# Patient Record
Sex: MALE | Race: BLACK OR AFRICAN AMERICAN | Employment: OTHER | URBAN - METROPOLITAN AREA
[De-identification: names, ages, dates, MRNs, and addresses within clinical notes are randomized per-mention and may not be internally consistent; named-entity substitution may affect disease eponyms.]

---

## 2018-02-13 ENCOUNTER — HOSPITAL ENCOUNTER (OUTPATIENT)
Age: 58
Setting detail: SPECIMEN
Discharge: HOME | End: 2018-02-13
Payer: COMMERCIAL

## 2018-02-13 LAB
ABSOLUTE EOS #: 0.15 K/UL (ref 0–0.44)
ABSOLUTE IMMATURE GRANULOCYTE: <0.03 K/UL (ref 0–0.3)
ABSOLUTE LYMPH #: 0.75 K/UL (ref 1.1–3.7)
ABSOLUTE MONO #: 0.52 K/UL (ref 0.1–1.2)
ALBUMIN SERPL-MCNC: 4.3 G/DL (ref 3.5–5.2)
ALBUMIN/GLOBULIN RATIO: 1.2 (ref 1–2.5)
ALP BLD-CCNC: 164 U/L (ref 40–129)
ALT SERPL-CCNC: 63 U/L (ref 5–41)
ANION GAP SERPL CALCULATED.3IONS-SCNC: 14 MMOL/L (ref 9–17)
AST SERPL-CCNC: 137 U/L
BASOPHILS # BLD: 1 % (ref 0–2)
BASOPHILS ABSOLUTE: <0.03 K/UL (ref 0–0.2)
BILIRUB SERPL-MCNC: 0.3 MG/DL (ref 0.3–1.2)
BILIRUBIN URINE: NEGATIVE
BUN BLDV-MCNC: 5 MG/DL (ref 6–20)
BUN/CREAT BLD: ABNORMAL (ref 9–20)
CALCIUM SERPL-MCNC: 8.5 MG/DL (ref 8.6–10.4)
CHLORIDE BLD-SCNC: 102 MMOL/L (ref 98–107)
CHOLESTEROL/HDL RATIO: 1.8
CHOLESTEROL: 163 MG/DL
CO2: 25 MMOL/L (ref 20–31)
COLOR: YELLOW
COMMENT UA: ABNORMAL
CREAT SERPL-MCNC: 0.66 MG/DL (ref 0.7–1.2)
DIFFERENTIAL TYPE: ABNORMAL
EOSINOPHILS RELATIVE PERCENT: 5 % (ref 1–4)
ESTIMATED AVERAGE GLUCOSE: 117 MG/DL
GFR AFRICAN AMERICAN: >60 ML/MIN
GFR NON-AFRICAN AMERICAN: >60 ML/MIN
GFR SERPL CREATININE-BSD FRML MDRD: ABNORMAL ML/MIN/{1.73_M2}
GFR SERPL CREATININE-BSD FRML MDRD: ABNORMAL ML/MIN/{1.73_M2}
GLUCOSE BLD-MCNC: 73 MG/DL (ref 70–99)
GLUCOSE URINE: NEGATIVE
HBA1C MFR BLD: 5.7 % (ref 4–6)
HCT VFR BLD CALC: 37.3 % (ref 40.7–50.3)
HDLC SERPL-MCNC: 89 MG/DL
HEMOGLOBIN: 12.2 G/DL (ref 13–17)
IMMATURE GRANULOCYTES: 0 %
KETONES, URINE: NEGATIVE
LDL CHOLESTEROL: 63 MG/DL (ref 0–130)
LEUKOCYTE ESTERASE, URINE: NEGATIVE
LYMPHOCYTES # BLD: 26 % (ref 24–43)
MCH RBC QN AUTO: 30.3 PG (ref 25.2–33.5)
MCHC RBC AUTO-ENTMCNC: 32.7 G/DL (ref 28.4–34.8)
MCV RBC AUTO: 92.8 FL (ref 82.6–102.9)
MONOCYTES # BLD: 18 % (ref 3–12)
NITRITE, URINE: NEGATIVE
NRBC AUTOMATED: 0 PER 100 WBC
PDW BLD-RTO: 13.2 % (ref 11.8–14.4)
PH UA: 7 (ref 5–8)
PLATELET # BLD: 138 K/UL (ref 138–453)
PLATELET ESTIMATE: ABNORMAL
PMV BLD AUTO: 10.3 FL (ref 8.1–13.5)
POTASSIUM SERPL-SCNC: 4.5 MMOL/L (ref 3.7–5.3)
PROTEIN UA: NEGATIVE
RBC # BLD: 4.02 M/UL (ref 4.21–5.77)
RBC # BLD: ABNORMAL 10*6/UL
SEG NEUTROPHILS: 50 % (ref 36–65)
SEGMENTED NEUTROPHILS ABSOLUTE COUNT: 1.44 K/UL (ref 1.5–8.1)
SODIUM BLD-SCNC: 141 MMOL/L (ref 135–144)
SPECIFIC GRAVITY UA: 1 (ref 1–1.03)
THYROXINE, FREE: 0.84 NG/DL (ref 0.93–1.7)
TOTAL PROTEIN: 7.8 G/DL (ref 6.4–8.3)
TRIGL SERPL-MCNC: 54 MG/DL
TSH SERPL DL<=0.05 MIU/L-ACNC: 1.38 MIU/L (ref 0.3–5)
TURBIDITY: CLEAR
URINE HGB: NEGATIVE
UROBILINOGEN, URINE: NORMAL
VITAMIN D 25-HYDROXY: 18.8 NG/ML (ref 30–100)
VLDLC SERPL CALC-MCNC: NORMAL MG/DL (ref 1–30)
WBC # BLD: 2.9 K/UL (ref 3.5–11.3)
WBC # BLD: ABNORMAL 10*3/UL

## 2018-02-22 ENCOUNTER — HOSPITAL ENCOUNTER (OUTPATIENT)
Age: 58
Setting detail: SPECIMEN
Discharge: HOME | End: 2018-02-22
Payer: COMMERCIAL

## 2018-02-22 LAB
ALBUMIN SERPL-MCNC: 4.7 G/DL (ref 3.5–5.2)
ALBUMIN/GLOBULIN RATIO: 1.4 (ref 1–2.5)
ALP BLD-CCNC: 168 U/L (ref 40–129)
ALT SERPL-CCNC: 67 U/L (ref 5–41)
ANION GAP SERPL CALCULATED.3IONS-SCNC: 16 MMOL/L (ref 9–17)
AST SERPL-CCNC: 141 U/L
BILIRUB SERPL-MCNC: 0.57 MG/DL (ref 0.3–1.2)
BUN BLDV-MCNC: 7 MG/DL (ref 6–20)
BUN/CREAT BLD: ABNORMAL (ref 9–20)
CALCIUM SERPL-MCNC: 8.8 MG/DL (ref 8.6–10.4)
CHLORIDE BLD-SCNC: 91 MMOL/L (ref 98–107)
CO2: 26 MMOL/L (ref 20–31)
CREAT SERPL-MCNC: 0.74 MG/DL (ref 0.7–1.2)
GFR AFRICAN AMERICAN: >60 ML/MIN
GFR NON-AFRICAN AMERICAN: >60 ML/MIN
GFR SERPL CREATININE-BSD FRML MDRD: ABNORMAL ML/MIN/{1.73_M2}
GFR SERPL CREATININE-BSD FRML MDRD: ABNORMAL ML/MIN/{1.73_M2}
GGT: 418 U/L (ref 8–61)
GLUCOSE BLD-MCNC: 94 MG/DL (ref 70–99)
HAV IGM SER IA-ACNC: NONREACTIVE
HEPATITIS B CORE IGM ANTIBODY: NONREACTIVE
HEPATITIS B SURFACE ANTIGEN: NONREACTIVE
HEPATITIS C ANTIBODY: NONREACTIVE
HIV AG/AB: NONREACTIVE
POTASSIUM SERPL-SCNC: 4.3 MMOL/L (ref 3.7–5.3)
SODIUM BLD-SCNC: 133 MMOL/L (ref 135–144)
T. PALLIDUM, IGG: NONREACTIVE
TOTAL PROTEIN: 8 G/DL (ref 6.4–8.3)

## 2018-05-15 ENCOUNTER — HOSPITAL ENCOUNTER (OUTPATIENT)
Age: 58
Setting detail: SPECIMEN
Discharge: HOME | End: 2018-05-15
Payer: COMMERCIAL

## 2018-05-15 LAB
ABSOLUTE EOS #: 0.15 K/UL (ref 0–0.44)
ABSOLUTE IMMATURE GRANULOCYTE: <0.03 K/UL (ref 0–0.3)
ABSOLUTE LYMPH #: 0.87 K/UL (ref 1.1–3.7)
ABSOLUTE MONO #: 0.56 K/UL (ref 0.1–1.2)
ALBUMIN SERPL-MCNC: 4.4 G/DL (ref 3.5–5.2)
ALBUMIN/GLOBULIN RATIO: 1.3 (ref 1–2.5)
ALP BLD-CCNC: 144 U/L (ref 40–129)
ALT SERPL-CCNC: 117 U/L (ref 5–41)
ANION GAP SERPL CALCULATED.3IONS-SCNC: 14 MMOL/L (ref 9–17)
AST SERPL-CCNC: 156 U/L
BASOPHILS # BLD: 1 % (ref 0–2)
BASOPHILS ABSOLUTE: <0.03 K/UL (ref 0–0.2)
BILIRUB SERPL-MCNC: 0.36 MG/DL (ref 0.3–1.2)
BUN BLDV-MCNC: 7 MG/DL (ref 6–20)
BUN/CREAT BLD: ABNORMAL (ref 9–20)
CALCIUM SERPL-MCNC: 8.9 MG/DL (ref 8.6–10.4)
CHLORIDE BLD-SCNC: 99 MMOL/L (ref 98–107)
CHOLESTEROL/HDL RATIO: 1.8
CHOLESTEROL: 167 MG/DL
CO2: 26 MMOL/L (ref 20–31)
CREAT SERPL-MCNC: 0.57 MG/DL (ref 0.7–1.2)
DIFFERENTIAL TYPE: ABNORMAL
EOSINOPHILS RELATIVE PERCENT: 4 % (ref 1–4)
ESTIMATED AVERAGE GLUCOSE: 108 MG/DL
GFR AFRICAN AMERICAN: >60 ML/MIN
GFR NON-AFRICAN AMERICAN: >60 ML/MIN
GFR SERPL CREATININE-BSD FRML MDRD: ABNORMAL ML/MIN/{1.73_M2}
GFR SERPL CREATININE-BSD FRML MDRD: ABNORMAL ML/MIN/{1.73_M2}
GLUCOSE BLD-MCNC: 76 MG/DL (ref 70–99)
HBA1C MFR BLD: 5.4 % (ref 4–6)
HCT VFR BLD CALC: 37.6 % (ref 40.7–50.3)
HDLC SERPL-MCNC: 93 MG/DL
HEMOGLOBIN: 12.3 G/DL (ref 13–17)
IMMATURE GRANULOCYTES: 0 %
LDL CHOLESTEROL: 63 MG/DL (ref 0–130)
LYMPHOCYTES # BLD: 24 % (ref 24–43)
MCH RBC QN AUTO: 30.8 PG (ref 25.2–33.5)
MCHC RBC AUTO-ENTMCNC: 32.7 G/DL (ref 28.4–34.8)
MCV RBC AUTO: 94 FL (ref 82.6–102.9)
MONOCYTES # BLD: 16 % (ref 3–12)
NRBC AUTOMATED: 0 PER 100 WBC
PDW BLD-RTO: 14.6 % (ref 11.8–14.4)
PLATELET # BLD: 152 K/UL (ref 138–453)
PLATELET ESTIMATE: ABNORMAL
PMV BLD AUTO: 10.8 FL (ref 8.1–13.5)
POTASSIUM SERPL-SCNC: 4.2 MMOL/L (ref 3.7–5.3)
RBC # BLD: 4 M/UL (ref 4.21–5.77)
RBC # BLD: ABNORMAL 10*6/UL
SEG NEUTROPHILS: 55 % (ref 36–65)
SEGMENTED NEUTROPHILS ABSOLUTE COUNT: 1.99 K/UL (ref 1.5–8.1)
SODIUM BLD-SCNC: 139 MMOL/L (ref 135–144)
THYROXINE, FREE: 1.04 NG/DL (ref 0.93–1.7)
TOTAL PROTEIN: 7.7 G/DL (ref 6.4–8.3)
TRIGL SERPL-MCNC: 57 MG/DL
TSH SERPL DL<=0.05 MIU/L-ACNC: 1.03 MIU/L (ref 0.3–5)
VITAMIN D 25-HYDROXY: 28.5 NG/ML (ref 30–100)
VLDLC SERPL CALC-MCNC: NORMAL MG/DL (ref 1–30)
WBC # BLD: 3.6 K/UL (ref 3.5–11.3)
WBC # BLD: ABNORMAL 10*3/UL

## 2018-05-18 ENCOUNTER — HOSPITAL ENCOUNTER (OUTPATIENT)
Dept: GENERAL RADIOLOGY | Age: 58
Discharge: HOME | End: 2018-05-20
Payer: COMMERCIAL

## 2018-05-18 ENCOUNTER — HOSPITAL ENCOUNTER (OUTPATIENT)
Age: 58
Discharge: HOME | End: 2018-05-20
Payer: COMMERCIAL

## 2018-05-18 DIAGNOSIS — R06.02 SOB (SHORTNESS OF BREATH): ICD-10-CM

## 2018-05-18 LAB
ALK PHOS BONE SPECIFIC: 49 U/L (ref 0–55)
ALK PHOS OTHER CALC: 0 U/L
ALK PHOSPHATASE: 145 U/L (ref 40–120)
ALKALINE PHOSPHATASE LIVER FRACTION: 96 U/L (ref 0–94)

## 2018-05-18 PROCEDURE — 71046 X-RAY EXAM CHEST 2 VIEWS: CPT

## 2018-07-27 ENCOUNTER — HOSPITAL ENCOUNTER (OUTPATIENT)
Dept: ULTRASOUND IMAGING | Age: 58
Discharge: HOME | End: 2018-07-29
Payer: COMMERCIAL

## 2018-07-27 DIAGNOSIS — R94.5 ABNORMAL RESULTS OF LIVER FUNCTION STUDIES: ICD-10-CM

## 2018-07-27 DIAGNOSIS — R79.89 ELEVATED LFTS: ICD-10-CM

## 2018-07-27 PROCEDURE — 76705 ECHO EXAM OF ABDOMEN: CPT

## 2018-08-17 ENCOUNTER — HOSPITAL ENCOUNTER (EMERGENCY)
Age: 58
Discharge: HOME | End: 2018-08-17
Attending: EMERGENCY MEDICINE
Payer: COMMERCIAL

## 2018-08-17 VITALS
RESPIRATION RATE: 12 BRPM | BODY MASS INDEX: 25.06 KG/M2 | TEMPERATURE: 98.4 F | DIASTOLIC BLOOD PRESSURE: 78 MMHG | OXYGEN SATURATION: 98 % | HEART RATE: 103 BPM | WEIGHT: 185 LBS | HEIGHT: 72 IN | SYSTOLIC BLOOD PRESSURE: 113 MMHG

## 2018-08-17 DIAGNOSIS — T88.7XXA MEDICATION SIDE EFFECT: Primary | ICD-10-CM

## 2018-08-17 LAB
ABSOLUTE EOS #: 0.15 K/UL (ref 0–0.44)
ABSOLUTE IMMATURE GRANULOCYTE: <0.03 K/UL (ref 0–0.3)
ABSOLUTE LYMPH #: 1.09 K/UL (ref 1.1–3.7)
ABSOLUTE MONO #: 0.67 K/UL (ref 0.1–1.2)
ALLEN TEST: ABNORMAL
ANION GAP SERPL CALCULATED.3IONS-SCNC: 15 MMOL/L (ref 9–17)
ANION GAP: 5 MMOL/L (ref 7–16)
BASOPHILS # BLD: 1 % (ref 0–2)
BASOPHILS ABSOLUTE: 0.03 K/UL (ref 0–0.2)
BUN BLDV-MCNC: 6 MG/DL (ref 6–20)
BUN/CREAT BLD: ABNORMAL (ref 9–20)
CALCIUM SERPL-MCNC: 9.2 MG/DL (ref 8.6–10.4)
CHLORIDE BLD-SCNC: 102 MMOL/L (ref 98–107)
CO2: 24 MMOL/L (ref 20–31)
CREAT SERPL-MCNC: 0.68 MG/DL (ref 0.7–1.2)
DIFFERENTIAL TYPE: ABNORMAL
EKG ATRIAL RATE: 98 BPM
EKG P AXIS: 71 DEGREES
EKG P-R INTERVAL: 182 MS
EKG Q-T INTERVAL: 382 MS
EKG QRS DURATION: 142 MS
EKG QTC CALCULATION (BAZETT): 487 MS
EKG R AXIS: 86 DEGREES
EKG T AXIS: 34 DEGREES
EKG VENTRICULAR RATE: 98 BPM
EOSINOPHILS RELATIVE PERCENT: 3 % (ref 1–4)
FIO2: ABNORMAL
GFR AFRICAN AMERICAN: >60 ML/MIN
GFR NON-AFRICAN AMERICAN: >60 ML/MIN
GFR NON-AFRICAN AMERICAN: >60 ML/MIN
GFR SERPL CREATININE-BSD FRML MDRD: >60 ML/MIN
GFR SERPL CREATININE-BSD FRML MDRD: ABNORMAL ML/MIN/{1.73_M2}
GFR SERPL CREATININE-BSD FRML MDRD: ABNORMAL ML/MIN/{1.73_M2}
GFR SERPL CREATININE-BSD FRML MDRD: NORMAL ML/MIN/{1.73_M2}
GLUCOSE BLD-MCNC: 92 MG/DL (ref 70–99)
GLUCOSE BLD-MCNC: 97 MG/DL (ref 74–100)
HCO3 VENOUS: 28.8 MMOL/L (ref 22–29)
HCT VFR BLD CALC: 39.6 % (ref 40.7–50.3)
HEMOGLOBIN: 13.4 G/DL (ref 13–17)
IMMATURE GRANULOCYTES: 0 %
INR BLD: 1
LYMPHOCYTES # BLD: 23 % (ref 24–43)
MCH RBC QN AUTO: 31.8 PG (ref 25.2–33.5)
MCHC RBC AUTO-ENTMCNC: 33.8 G/DL (ref 28.4–34.8)
MCV RBC AUTO: 93.8 FL (ref 82.6–102.9)
MODE: ABNORMAL
MONOCYTES # BLD: 14 % (ref 3–12)
NEGATIVE BASE EXCESS, VEN: ABNORMAL (ref 0–2)
NRBC AUTOMATED: 0 PER 100 WBC
O2 DEVICE/FLOW/%: ABNORMAL
O2 SAT, VEN: 95 % (ref 60–85)
PARTIAL THROMBOPLASTIN TIME: 24.6 SEC (ref 20.5–30.5)
PATIENT TEMP: ABNORMAL
PCO2, VEN: 45 MM HG (ref 41–51)
PDW BLD-RTO: 14.3 % (ref 11.8–14.4)
PH VENOUS: 7.41 (ref 7.32–7.43)
PLATELET # BLD: 174 K/UL (ref 138–453)
PLATELET ESTIMATE: ABNORMAL
PMV BLD AUTO: 10.6 FL (ref 8.1–13.5)
PO2, VEN: 74.1 MM HG (ref 30–50)
POC CHLORIDE: 108 MMOL/L (ref 98–107)
POC CREATININE: 0.76 MG/DL (ref 0.51–1.19)
POC HEMATOCRIT: 43 % (ref 41–53)
POC HEMOGLOBIN: 14.8 G/DL (ref 13.5–17.5)
POC IONIZED CALCIUM: 1.09 MMOL/L (ref 1.15–1.33)
POC LACTIC ACID: 1.13 MMOL/L (ref 0.56–1.39)
POC PCO2 TEMP: ABNORMAL MM HG
POC PH TEMP: ABNORMAL
POC PO2 TEMP: ABNORMAL MM HG
POC POTASSIUM: 4.9 MMOL/L (ref 3.5–4.5)
POC SODIUM: 142 MMOL/L (ref 138–146)
POC TROPONIN I: 0 NG/ML (ref 0–0.1)
POC TROPONIN INTERP: NORMAL
POSITIVE BASE EXCESS, VEN: 4 (ref 0–3)
POTASSIUM SERPL-SCNC: 5 MMOL/L (ref 3.7–5.3)
PROTHROMBIN TIME: 10.4 SEC (ref 9–12)
RBC # BLD: 4.22 M/UL (ref 4.21–5.77)
RBC # BLD: ABNORMAL 10*6/UL
SAMPLE SITE: ABNORMAL
SEG NEUTROPHILS: 59 % (ref 36–65)
SEGMENTED NEUTROPHILS ABSOLUTE COUNT: 2.72 K/UL (ref 1.5–8.1)
SODIUM BLD-SCNC: 141 MMOL/L (ref 135–144)
TOTAL CO2, VENOUS: 30 MMOL/L (ref 23–30)
WBC # BLD: 4.7 K/UL (ref 3.5–11.3)
WBC # BLD: ABNORMAL 10*3/UL

## 2018-08-17 PROCEDURE — 80048 BASIC METABOLIC PNL TOTAL CA: CPT

## 2018-08-17 PROCEDURE — 85730 THROMBOPLASTIN TIME PARTIAL: CPT

## 2018-08-17 PROCEDURE — 85014 HEMATOCRIT: CPT

## 2018-08-17 PROCEDURE — 82803 BLOOD GASES ANY COMBINATION: CPT

## 2018-08-17 PROCEDURE — 82330 ASSAY OF CALCIUM: CPT

## 2018-08-17 PROCEDURE — 6370000000 HC RX 637 (ALT 250 FOR IP): Performed by: STUDENT IN AN ORGANIZED HEALTH CARE EDUCATION/TRAINING PROGRAM

## 2018-08-17 PROCEDURE — 84132 ASSAY OF SERUM POTASSIUM: CPT

## 2018-08-17 PROCEDURE — 85025 COMPLETE CBC W/AUTO DIFF WBC: CPT

## 2018-08-17 PROCEDURE — 82565 ASSAY OF CREATININE: CPT

## 2018-08-17 PROCEDURE — 84295 ASSAY OF SERUM SODIUM: CPT

## 2018-08-17 PROCEDURE — 83605 ASSAY OF LACTIC ACID: CPT

## 2018-08-17 PROCEDURE — 85610 PROTHROMBIN TIME: CPT

## 2018-08-17 PROCEDURE — G0383 LEV 4 HOSP TYPE B ED VISIT: HCPCS

## 2018-08-17 PROCEDURE — 82435 ASSAY OF BLOOD CHLORIDE: CPT

## 2018-08-17 PROCEDURE — 84484 ASSAY OF TROPONIN QUANT: CPT

## 2018-08-17 PROCEDURE — 93005 ELECTROCARDIOGRAM TRACING: CPT

## 2018-08-17 PROCEDURE — 82947 ASSAY GLUCOSE BLOOD QUANT: CPT

## 2018-08-17 RX ORDER — BENZTROPINE MESYLATE 1 MG/ML
1 INJECTION INTRAMUSCULAR; INTRAVENOUS ONCE
Status: DISCONTINUED | OUTPATIENT
Start: 2018-08-17 | End: 2018-08-17

## 2018-08-17 RX ORDER — BENZTROPINE MESYLATE 1 MG/1
1 TABLET ORAL ONCE
Status: COMPLETED | OUTPATIENT
Start: 2018-08-17 | End: 2018-08-17

## 2018-08-17 RX ADMIN — BENZTROPINE MESYLATE 1 MG: 1 TABLET ORAL at 15:52

## 2018-08-17 ASSESSMENT — ENCOUNTER SYMPTOMS
DIARRHEA: 0
NAUSEA: 0
EYE REDNESS: 0
RHINORRHEA: 0
EYE ITCHING: 0
SHORTNESS OF BREATH: 0
ABDOMINAL PAIN: 0
BACK PAIN: 0
CONSTIPATION: 0
VOMITING: 0
COUGH: 0

## 2018-08-17 NOTE — ED NOTES
Pt to ED from [facility] for increased slurred speech and right ankle numbness. Pt denies any chest pain or SOB. Pt is alert and oriented x4. NAD noted at this time. Pt states symptoms have been going on for the past couple weeks.  Will continue to monitor      Elizabeth Da Silva RN  08/17/18 3029

## 2018-08-17 NOTE — ED PROVIDER NOTES
Dictation #1  MRN:[id number]  CSN:[id number]  JOB # [id number]   39.00     Types: Cigarettes    Smokeless tobacco: Never Used    Alcohol use Yes      Comment: 8 times/week    Drug use: No    Sexual activity: Not on file     Other Topics Concern    Not on file     Social History Narrative    No narrative on file       Family History   Problem Relation Age of Onset    Cancer Mother     Cancer Brother         breast cancer       Allergies:  Patient has no known allergies. Home Medications:  Prior to Admission medications    Medication Sig Start Date End Date Taking? Authorizing Provider   sennosides-docusate sodium (SENOKOT-S) 8.6-50 MG tablet TAKE 2 TABLETS EVERY 12 HOURS 10/26/12   Heri Barrett MD   hydrocortisone 1 % cream Apply  topically 2 times daily. Apply topically 2 times daily. Historical Provider, MD   ABILIFY 30 MG tablet Take 1 tablet by mouth daily. 8/17/12   Historical Provider, MD   benztropine (COGENTIN) 0.5 MG tablet Take 1 tablet by mouth 2 times daily. 8/17/12   Historical Provider, MD   CYMBALTA 60 MG capsule Take 1 capsule by mouth daily. 8/17/12   Historical Provider, MD   traZODone (DESYREL) 150 MG tablet Take 1 tablet by mouth 2 times daily. 8/17/12   Historical Provider, MD   trifluoperazine (STELAZINE) 10 MG tablet Take 1 tablet by mouth daily. 8/17/12   Historical Provider, MD   trifluoperazine (STELAZINE) 5 MG tablet Take 1 tablet by mouth 2 times daily. 8/31/12   Historical Provider, MD       REVIEW OF SYSTEMS    (2-9 systems for level 4, 10 or more for level 5)      Review of Systems   Constitutional: Negative for chills and fever. HENT: Negative for congestion and rhinorrhea. Eyes: Negative for redness and itching. Respiratory: Negative for cough and shortness of breath. Cardiovascular: Negative for chest pain and leg swelling. Gastrointestinal: Negative for abdominal pain, constipation, diarrhea, nausea and vomiting. Genitourinary: Negative for dysuria and frequency.    Musculoskeletal: Negative for back pain and neck NOT REPORTED    Basic Metabolic Panel   Result Value Ref Range    Glucose 92 70 - 99 mg/dL    BUN 6 6 - 20 mg/dL    CREATININE 0.68 (L) 0.70 - 1.20 mg/dL    Bun/Cre Ratio NOT REPORTED 9 - 20    Calcium 9.2 8.6 - 10.4 mg/dL    Sodium 141 135 - 144 mmol/L    Potassium 5.0 3.7 - 5.3 mmol/L    Chloride 102 98 - 107 mmol/L    CO2 24 20 - 31 mmol/L    Anion Gap 15 9 - 17 mmol/L    GFR Non-African American >60 >60 mL/min    GFR African American >60 >60 mL/min    GFR Comment          GFR Staging NOT REPORTED    Protime-INR   Result Value Ref Range    Protime 10.4 9.0 - 12.0 sec    INR 1.0    APTT   Result Value Ref Range    PTT 24.6 20.5 - 30.5 sec   Venous Blood Gas, POC   Result Value Ref Range    pH, Doroteo 7.414 7.320 - 7.430    pCO2, Doroteo 45.0 41.0 - 51.0 mm Hg    pO2, Doroteo 74.1 (H) 30.0 - 50.0 mm Hg    HCO3, Venous 28.8 22.0 - 29.0 mmol/L    Total CO2, Venous 30 23.0 - 30.0 mmol/L    Negative Base Excess, Doroteo NOT REPORTED 0.0 - 2.0    Positive Base Excess, Doroteo 4 (H) 0.0 - 3.0    O2 Sat, Doroteo 95 (H) 60.0 - 85.0 %    O2 Device/Flow/% NOT REPORTED     Tin Test NOT REPORTED     Sample Site NOT REPORTED     Mode NOT REPORTED     FIO2 NOT REPORTED     Pt Temp NOT REPORTED     POC pH Temp NOT REPORTED     POC pCO2 Temp NOT REPORTED mm Hg    POC pO2 Temp NOT REPORTED mm Hg   Creatinine W/GFR Point of Care   Result Value Ref Range    POC Creatinine 0.76 0.51 - 1.19 mg/dL    GFR Comment >60 >60 mL/min    GFR Non-African American >60 >60 mL/min    GFR Comment         Lactic Acid, POC   Result Value Ref Range    POC Lactic Acid 1.13 0.56 - 1.39 mmol/L   POCT Glucose   Result Value Ref Range    POC Glucose 97 74 - 100 mg/dL   Anion Gap (Calc) POC   Result Value Ref Range    Anion Gap 5 (L) 7 - 16 mmol/L   POCT troponin   Result Value Ref Range    POC Troponin I 0.00 0.00 - 0.10 ng/mL    POC Troponin Interp       The Troponin-I (POC) results cannot be compared to the Troponin-T results. IMPRESSION: Andrzej Paul is a 66 y.o.

## 2018-08-17 NOTE — ED NOTES
Pt states to writer \"the doctor told me I could go home, so I took out my IV\".       Olive Hinkle RN  08/17/18 [id number]

## 2018-08-30 ENCOUNTER — HOSPITAL ENCOUNTER (OUTPATIENT)
Age: 58
Setting detail: SPECIMEN
Discharge: HOME | End: 2018-08-30
Payer: COMMERCIAL

## 2018-08-30 LAB
ABSOLUTE EOS #: 0.24 K/UL (ref 0–0.44)
ABSOLUTE IMMATURE GRANULOCYTE: <0.03 K/UL (ref 0–0.3)
ABSOLUTE LYMPH #: 0.8 K/UL (ref 1.1–3.7)
ABSOLUTE MONO #: 0.68 K/UL (ref 0.1–1.2)
ALPHA-1 ANTITRYPSIN: 120 MG/DL (ref 90–200)
BASOPHILS # BLD: 1 % (ref 0–2)
BASOPHILS ABSOLUTE: 0.05 K/UL (ref 0–0.2)
C-REACTIVE PROTEIN: 0.6 MG/L (ref 0–5)
CERULOPLASMIN: 21 MG/DL (ref 15–30)
DIFFERENTIAL TYPE: ABNORMAL
EOSINOPHILS RELATIVE PERCENT: 4 % (ref 1–4)
FOLATE: >20 NG/ML
HCT VFR BLD CALC: 38.5 % (ref 40.7–50.3)
HEMOGLOBIN: 12.4 G/DL (ref 13–17)
IMMATURE GRANULOCYTES: 0 %
LYMPHOCYTES # BLD: 14 % (ref 24–43)
MAGNESIUM: 2.1 MG/DL (ref 1.6–2.6)
MCH RBC QN AUTO: 31.9 PG (ref 25.2–33.5)
MCHC RBC AUTO-ENTMCNC: 32.2 G/DL (ref 28.4–34.8)
MCV RBC AUTO: 99 FL (ref 82.6–102.9)
MONOCYTES # BLD: 12 % (ref 3–12)
NRBC AUTOMATED: 0 PER 100 WBC
PDW BLD-RTO: 14.4 % (ref 11.8–14.4)
PHOSPHORUS: 3.3 MG/DL (ref 2.5–4.5)
PLATELET # BLD: 141 K/UL (ref 138–453)
PLATELET ESTIMATE: ABNORMAL
PMV BLD AUTO: 10.9 FL (ref 8.1–13.5)
RBC # BLD: 3.89 M/UL (ref 4.21–5.77)
RBC # BLD: ABNORMAL 10*6/UL
RHEUMATOID FACTOR: <10 IU/ML
SEG NEUTROPHILS: 69 % (ref 36–65)
SEGMENTED NEUTROPHILS ABSOLUTE COUNT: 3.79 K/UL (ref 1.5–8.1)
VITAMIN B-12: 530 PG/ML (ref 232–1245)
WBC # BLD: 5.6 K/UL (ref 3.5–11.3)
WBC # BLD: ABNORMAL 10*3/UL

## 2018-08-31 LAB — ANTI-NUCLEAR ANTIBODY (ANA): NEGATIVE

## 2018-09-13 ENCOUNTER — HOSPITAL ENCOUNTER (OUTPATIENT)
Age: 58
Setting detail: SPECIMEN
Discharge: HOME | End: 2018-09-13
Payer: COMMERCIAL

## 2018-09-13 LAB
HAV IGM SER IA-ACNC: ABNORMAL
HEPATITIS B CORE IGM ANTIBODY: NONREACTIVE
HEPATITIS B SURFACE ANTIGEN: NONREACTIVE
HEPATITIS C ANTIBODY: NONREACTIVE
HIV AG/AB: NONREACTIVE

## 2018-09-16 LAB
SEND OUT REPORT: NORMAL
TEST NAME: NORMAL

## 2019-05-22 ENCOUNTER — HOSPITAL ENCOUNTER (OUTPATIENT)
Age: 59
Discharge: HOME | End: 2019-05-24
Payer: COMMERCIAL

## 2019-05-22 ENCOUNTER — HOSPITAL ENCOUNTER (OUTPATIENT)
Dept: GENERAL RADIOLOGY | Age: 59
Discharge: HOME | End: 2019-05-24
Payer: COMMERCIAL

## 2019-05-22 ENCOUNTER — HOSPITAL ENCOUNTER (OUTPATIENT)
Age: 59
Discharge: HOME | End: 2019-05-22
Payer: COMMERCIAL

## 2019-05-22 ENCOUNTER — HOSPITAL ENCOUNTER (OUTPATIENT)
Dept: NON INVASIVE DIAGNOSTICS | Age: 59
Discharge: HOME | End: 2019-05-22
Payer: COMMERCIAL

## 2019-05-22 DIAGNOSIS — R06.02 BREATH SHORTNESS: ICD-10-CM

## 2019-05-22 LAB
ABSOLUTE EOS #: 0.04 K/UL (ref 0–0.44)
ABSOLUTE IMMATURE GRANULOCYTE: 0 K/UL (ref 0–0.3)
ABSOLUTE LYMPH #: 0.62 K/UL (ref 1.1–3.7)
ABSOLUTE MONO #: 0.62 K/UL (ref 0.1–1.2)
ALBUMIN SERPL-MCNC: 4.4 G/DL (ref 3.5–5.2)
ALBUMIN/GLOBULIN RATIO: 1.2 (ref 1–2.5)
ALP BLD-CCNC: 139 U/L (ref 40–129)
ALT SERPL-CCNC: 30 U/L (ref 5–41)
ANION GAP SERPL CALCULATED.3IONS-SCNC: 12 MMOL/L (ref 9–17)
AST SERPL-CCNC: 54 U/L
BASOPHILS # BLD: 1 % (ref 0–2)
BASOPHILS ABSOLUTE: 0.04 K/UL (ref 0–0.2)
BILIRUB SERPL-MCNC: 0.36 MG/DL (ref 0.3–1.2)
BUN BLDV-MCNC: 6 MG/DL (ref 6–20)
BUN/CREAT BLD: ABNORMAL (ref 9–20)
CALCIUM SERPL-MCNC: 9.6 MG/DL (ref 8.6–10.4)
CHLORIDE BLD-SCNC: 104 MMOL/L (ref 98–107)
CHOLESTEROL, FASTING: 169 MG/DL
CHOLESTEROL/HDL RATIO: 1.9
CO2: 26 MMOL/L (ref 20–31)
CREAT SERPL-MCNC: 0.69 MG/DL (ref 0.7–1.2)
DIFFERENTIAL TYPE: ABNORMAL
EOSINOPHILS RELATIVE PERCENT: 1 % (ref 1–4)
GFR AFRICAN AMERICAN: >60 ML/MIN
GFR NON-AFRICAN AMERICAN: >60 ML/MIN
GFR SERPL CREATININE-BSD FRML MDRD: ABNORMAL ML/MIN/{1.73_M2}
GFR SERPL CREATININE-BSD FRML MDRD: ABNORMAL ML/MIN/{1.73_M2}
GLUCOSE FASTING: 89 MG/DL (ref 70–99)
HCT VFR BLD CALC: 41.6 % (ref 40.7–50.3)
HDLC SERPL-MCNC: 89 MG/DL
HEMOGLOBIN: 13.9 G/DL (ref 13–17)
IMMATURE GRANULOCYTES: 0 %
LDL CHOLESTEROL: 72 MG/DL (ref 0–130)
LV EF: 65 %
LVEF MODALITY: NORMAL
LYMPHOCYTES # BLD: 16 % (ref 24–43)
MCH RBC QN AUTO: 31.7 PG (ref 25.2–33.5)
MCHC RBC AUTO-ENTMCNC: 33.4 G/DL (ref 28.4–34.8)
MCV RBC AUTO: 95 FL (ref 82.6–102.9)
MONOCYTES # BLD: 16 % (ref 3–12)
MORPHOLOGY: ABNORMAL
NRBC AUTOMATED: 0 PER 100 WBC
PDW BLD-RTO: 14.5 % (ref 11.8–14.4)
PLATELET # BLD: 148 K/UL (ref 138–453)
PLATELET ESTIMATE: ABNORMAL
PMV BLD AUTO: 10.3 FL (ref 8.1–13.5)
POTASSIUM SERPL-SCNC: 4.8 MMOL/L (ref 3.7–5.3)
RBC # BLD: 4.38 M/UL (ref 4.21–5.77)
RBC # BLD: ABNORMAL 10*6/UL
SEG NEUTROPHILS: 66 % (ref 36–65)
SEGMENTED NEUTROPHILS ABSOLUTE COUNT: 2.58 K/UL (ref 1.5–8.1)
SODIUM BLD-SCNC: 142 MMOL/L (ref 135–144)
TOTAL PROTEIN: 8.1 G/DL (ref 6.4–8.3)
TRIGLYCERIDE, FASTING: 38 MG/DL
VLDLC SERPL CALC-MCNC: NORMAL MG/DL (ref 1–30)
WBC # BLD: 3.9 K/UL (ref 3.5–11.3)
WBC # BLD: ABNORMAL 10*3/UL

## 2019-05-22 PROCEDURE — 93005 ELECTROCARDIOGRAM TRACING: CPT

## 2019-05-22 PROCEDURE — 80053 COMPREHEN METABOLIC PANEL: CPT

## 2019-05-22 PROCEDURE — 80061 LIPID PANEL: CPT

## 2019-05-22 PROCEDURE — 83036 HEMOGLOBIN GLYCOSYLATED A1C: CPT

## 2019-05-22 PROCEDURE — 71046 X-RAY EXAM CHEST 2 VIEWS: CPT

## 2019-05-22 PROCEDURE — 36415 COLL VENOUS BLD VENIPUNCTURE: CPT

## 2019-05-22 PROCEDURE — 93306 TTE W/DOPPLER COMPLETE: CPT

## 2019-05-22 PROCEDURE — 93005 ELECTROCARDIOGRAM TRACING: CPT | Performed by: NURSE PRACTITIONER

## 2019-05-22 PROCEDURE — 85025 COMPLETE CBC W/AUTO DIFF WBC: CPT

## 2019-05-23 LAB
EKG ATRIAL RATE: 76 BPM
EKG P AXIS: 84 DEGREES
EKG P-R INTERVAL: 184 MS
EKG Q-T INTERVAL: 414 MS
EKG QRS DURATION: 138 MS
EKG QTC CALCULATION (BAZETT): 465 MS
EKG R AXIS: 93 DEGREES
EKG T AXIS: 78 DEGREES
EKG VENTRICULAR RATE: 76 BPM

## 2019-05-24 LAB
ESTIMATED AVERAGE GLUCOSE: 108 MG/DL
HBA1C MFR BLD: 5.4 % (ref 4–6)

## 2019-09-05 ENCOUNTER — HOSPITAL ENCOUNTER (OUTPATIENT)
Age: 59
Discharge: HOME | End: 2019-09-05
Payer: COMMERCIAL

## 2019-09-05 LAB
ABSOLUTE EOS #: 0.04 K/UL (ref 0–0.44)
ABSOLUTE IMMATURE GRANULOCYTE: 0 K/UL (ref 0–0.3)
ABSOLUTE LYMPH #: 0.61 K/UL (ref 1.1–3.7)
ABSOLUTE MONO #: 0.53 K/UL (ref 0.1–1.2)
ALBUMIN SERPL-MCNC: 4.3 G/DL (ref 3.5–5.2)
ALBUMIN/GLOBULIN RATIO: 1 (ref 1–2.5)
ALP BLD-CCNC: 147 U/L (ref 40–129)
ALT SERPL-CCNC: 54 U/L (ref 5–41)
ANION GAP SERPL CALCULATED.3IONS-SCNC: 15 MMOL/L (ref 9–17)
AST SERPL-CCNC: 100 U/L
BASOPHILS # BLD: 1 % (ref 0–2)
BASOPHILS ABSOLUTE: 0.04 K/UL (ref 0–0.2)
BILIRUB SERPL-MCNC: 0.43 MG/DL (ref 0.3–1.2)
BUN BLDV-MCNC: 6 MG/DL (ref 6–20)
BUN/CREAT BLD: ABNORMAL (ref 9–20)
CALCIUM SERPL-MCNC: 9.1 MG/DL (ref 8.6–10.4)
CHLORIDE BLD-SCNC: 104 MMOL/L (ref 98–107)
CHOLESTEROL/HDL RATIO: 2.2
CHOLESTEROL: 168 MG/DL
CO2: 24 MMOL/L (ref 20–31)
CREAT SERPL-MCNC: 0.67 MG/DL (ref 0.7–1.2)
DIFFERENTIAL TYPE: ABNORMAL
EOSINOPHILS RELATIVE PERCENT: 1 % (ref 1–4)
ESTIMATED AVERAGE GLUCOSE: 120 MG/DL
FERRITIN: 1249 UG/L (ref 30–400)
FOLATE: 19.6 NG/ML
GFR AFRICAN AMERICAN: >60 ML/MIN
GFR NON-AFRICAN AMERICAN: >60 ML/MIN
GFR SERPL CREATININE-BSD FRML MDRD: ABNORMAL ML/MIN/{1.73_M2}
GFR SERPL CREATININE-BSD FRML MDRD: ABNORMAL ML/MIN/{1.73_M2}
GLUCOSE BLD-MCNC: 90 MG/DL (ref 70–99)
HAV IGM SER IA-ACNC: NONREACTIVE
HBA1C MFR BLD: 5.8 % (ref 4–6)
HCT VFR BLD CALC: 40.8 % (ref 40.7–50.3)
HDLC SERPL-MCNC: 78 MG/DL
HEMOGLOBIN: 13.7 G/DL (ref 13–17)
HEPATITIS B CORE IGM ANTIBODY: NONREACTIVE
HEPATITIS B SURFACE ANTIGEN: NONREACTIVE
HEPATITIS C ANTIBODY: NONREACTIVE
HIV AG/AB: NONREACTIVE
IMMATURE GRANULOCYTES: 0 %
LDL CHOLESTEROL: 81 MG/DL (ref 0–130)
LYMPHOCYTES # BLD: 16 % (ref 24–43)
MCH RBC QN AUTO: 31.7 PG (ref 25.2–33.5)
MCHC RBC AUTO-ENTMCNC: 33.6 G/DL (ref 28.4–34.8)
MCV RBC AUTO: 94.4 FL (ref 82.6–102.9)
MONOCYTES # BLD: 14 % (ref 3–12)
MORPHOLOGY: NORMAL
NRBC AUTOMATED: 0 PER 100 WBC
PDW BLD-RTO: 13.9 % (ref 11.8–14.4)
PLATELET # BLD: ABNORMAL K/UL (ref 138–453)
PLATELET ESTIMATE: ABNORMAL
PLATELET, FLUORESCENCE: 125 K/UL (ref 138–453)
PLATELET, IMMATURE FRACTION: 4.6 % (ref 1.1–10.3)
PMV BLD AUTO: ABNORMAL FL (ref 8.1–13.5)
POTASSIUM SERPL-SCNC: 4.5 MMOL/L (ref 3.7–5.3)
RBC # BLD: 4.32 M/UL (ref 4.21–5.77)
RBC # BLD: ABNORMAL 10*6/UL
SEG NEUTROPHILS: 68 % (ref 36–65)
SEGMENTED NEUTROPHILS ABSOLUTE COUNT: 2.58 K/UL (ref 1.5–8.1)
SODIUM BLD-SCNC: 143 MMOL/L (ref 135–144)
THYROXINE, FREE: 0.71 NG/DL (ref 0.93–1.7)
TOTAL PROTEIN: 8.4 G/DL (ref 6.4–8.3)
TRIGL SERPL-MCNC: 44 MG/DL
TSH SERPL DL<=0.05 MIU/L-ACNC: 2.01 MIU/L (ref 0.3–5)
VITAMIN B-12: 706 PG/ML (ref 232–1245)
VLDLC SERPL CALC-MCNC: NORMAL MG/DL (ref 1–30)
WBC # BLD: 3.8 K/UL (ref 3.5–11.3)
WBC # BLD: ABNORMAL 10*3/UL

## 2019-09-05 PROCEDURE — 82607 VITAMIN B-12: CPT

## 2019-09-05 PROCEDURE — 84439 ASSAY OF FREE THYROXINE: CPT

## 2019-09-05 PROCEDURE — 87389 HIV-1 AG W/HIV-1&-2 AB AG IA: CPT

## 2019-09-05 PROCEDURE — 36415 COLL VENOUS BLD VENIPUNCTURE: CPT

## 2019-09-05 PROCEDURE — 80061 LIPID PANEL: CPT

## 2019-09-05 PROCEDURE — 81001 URINALYSIS AUTO W/SCOPE: CPT

## 2019-09-05 PROCEDURE — 82746 ASSAY OF FOLIC ACID SERUM: CPT

## 2019-09-05 PROCEDURE — 80074 ACUTE HEPATITIS PANEL: CPT

## 2019-09-05 PROCEDURE — 80053 COMPREHEN METABOLIC PANEL: CPT

## 2019-09-05 PROCEDURE — 82728 ASSAY OF FERRITIN: CPT

## 2019-09-05 PROCEDURE — 85025 COMPLETE CBC W/AUTO DIFF WBC: CPT

## 2019-09-05 PROCEDURE — 85055 RETICULATED PLATELET ASSAY: CPT

## 2019-09-05 PROCEDURE — 87086 URINE CULTURE/COLONY COUNT: CPT

## 2019-09-05 PROCEDURE — 83036 HEMOGLOBIN GLYCOSYLATED A1C: CPT

## 2019-09-05 PROCEDURE — 84443 ASSAY THYROID STIM HORMONE: CPT

## 2019-09-06 LAB
-: NORMAL
AMORPHOUS: NORMAL
BACTERIA: NORMAL
BILIRUBIN URINE: NEGATIVE
CASTS UA: NORMAL /LPF (ref 0–8)
COLOR: YELLOW
COMMENT UA: ABNORMAL
CRYSTALS, UA: NORMAL /HPF
CULTURE: NO GROWTH
EPITHELIAL CELLS UA: NORMAL /HPF (ref 0–5)
GLUCOSE URINE: NEGATIVE
KETONES, URINE: NEGATIVE
LEUKOCYTE ESTERASE, URINE: NEGATIVE
Lab: NORMAL
MUCUS: NORMAL
NITRITE, URINE: NEGATIVE
OTHER OBSERVATIONS UA: NORMAL
PH UA: 5 (ref 5–8)
PROTEIN UA: NEGATIVE
RBC UA: NORMAL /HPF (ref 0–4)
RENAL EPITHELIAL, UA: NORMAL /HPF
SPECIFIC GRAVITY UA: 1.02 (ref 1–1.03)
SPECIMEN DESCRIPTION: NORMAL
TRICHOMONAS: NORMAL
TURBIDITY: ABNORMAL
URINE HGB: NEGATIVE
UROBILINOGEN, URINE: NORMAL
WBC UA: NORMAL /HPF (ref 0–5)
YEAST: NORMAL

## 2019-09-25 ENCOUNTER — HOSPITAL ENCOUNTER (OUTPATIENT)
Age: 59
Discharge: HOME | End: 2019-09-25
Payer: COMMERCIAL

## 2019-09-25 LAB — GGT: 291 U/L (ref 8–61)

## 2019-09-25 PROCEDURE — 84075 ASSAY ALKALINE PHOSPHATASE: CPT

## 2019-09-25 PROCEDURE — 82977 ASSAY OF GGT: CPT

## 2019-09-25 PROCEDURE — 36415 COLL VENOUS BLD VENIPUNCTURE: CPT

## 2019-09-25 PROCEDURE — 84080 ASSAY ALKALINE PHOSPHATASES: CPT

## 2019-09-28 LAB
ALK PHOS BONE SPECIFIC: 23 U/L (ref 0–55)
ALK PHOS OTHER CALC: 0 U/L
ALK PHOSPHATASE: 154 U/L (ref 40–120)
ALKALINE PHOSPHATASE LIVER FRACTION: 131 U/L (ref 0–94)

## 2019-10-04 ENCOUNTER — HOSPITAL ENCOUNTER (OUTPATIENT)
Dept: ULTRASOUND IMAGING | Age: 59
Discharge: HOME | End: 2019-10-06
Payer: COMMERCIAL

## 2019-10-04 DIAGNOSIS — R74.01 TRANSAMINITIS: ICD-10-CM

## 2019-10-04 PROCEDURE — 76705 ECHO EXAM OF ABDOMEN: CPT

## 2020-03-05 ENCOUNTER — TELEPHONE (OUTPATIENT)
Dept: GASTROENTEROLOGY | Age: 60
End: 2020-03-05

## 2020-03-05 NOTE — TELEPHONE ENCOUNTER
Patient states that he is not good at making appointments and is going to have someone from [facility] call on his behalf to schedule. In meantime sending back to the referring provider.   1st attempt

## 2020-07-27 ENCOUNTER — TELEPHONE (OUTPATIENT)
Dept: GASTROENTEROLOGY | Age: 60
End: 2020-07-27

## 2020-08-11 ENCOUNTER — HOSPITAL ENCOUNTER (OUTPATIENT)
Age: 60
Setting detail: SPECIMEN
Discharge: HOME | End: 2020-08-11
Payer: COMMERCIAL

## 2020-08-11 LAB
ANION GAP SERPL CALCULATED.3IONS-SCNC: 19 MMOL/L (ref 9–17)
BUN BLDV-MCNC: 5 MG/DL (ref 6–20)
BUN/CREAT BLD: ABNORMAL (ref 9–20)
CALCIUM SERPL-MCNC: 9.4 MG/DL (ref 8.6–10.4)
CHLORIDE BLD-SCNC: 102 MMOL/L (ref 98–107)
CHOLESTEROL/HDL RATIO: 2
CHOLESTEROL: 140 MG/DL
CO2: 22 MMOL/L (ref 20–31)
CREAT SERPL-MCNC: 0.68 MG/DL (ref 0.7–1.2)
ESTIMATED AVERAGE GLUCOSE: 111 MG/DL
GFR AFRICAN AMERICAN: >60 ML/MIN
GFR NON-AFRICAN AMERICAN: >60 ML/MIN
GFR SERPL CREATININE-BSD FRML MDRD: ABNORMAL ML/MIN/{1.73_M2}
GFR SERPL CREATININE-BSD FRML MDRD: ABNORMAL ML/MIN/{1.73_M2}
GLUCOSE BLD-MCNC: 88 MG/DL (ref 70–99)
HBA1C MFR BLD: 5.5 % (ref 4–6)
HDLC SERPL-MCNC: 71 MG/DL
LDL CHOLESTEROL: 56 MG/DL (ref 0–130)
POTASSIUM SERPL-SCNC: 4.1 MMOL/L (ref 3.7–5.3)
SODIUM BLD-SCNC: 143 MMOL/L (ref 135–144)
TRIGL SERPL-MCNC: 63 MG/DL
VLDLC SERPL CALC-MCNC: NORMAL MG/DL (ref 1–30)

## 2020-08-19 NOTE — TELEPHONE ENCOUNTER
Received a call from Deane Simmonds at [facility] () to schedule from referral.  Made appointment for 8/25/20 2 pm at the [place] office. Writer thanked and call ended.

## 2020-08-19 NOTE — TELEPHONE ENCOUNTER
Shira Gilman  at [facility] [phone]  called LVM 8/19/2020 @ 11:18. That patient needs an appointment. Returned his call had to leave a message for him to call us to schedule .  Thank You

## 2020-08-25 ENCOUNTER — OFFICE VISIT (OUTPATIENT)
Dept: GASTROENTEROLOGY | Age: 60
End: 2020-08-25
Payer: COMMERCIAL

## 2020-08-25 VITALS — TEMPERATURE: 97.2 F | BODY MASS INDEX: 21.75 KG/M2 | WEIGHT: 160.6 LBS | HEIGHT: 72 IN

## 2020-08-25 PROCEDURE — G8420 CALC BMI NORM PARAMETERS: HCPCS | Performed by: INTERNAL MEDICINE

## 2020-08-25 PROCEDURE — APPSS45 APP SPLIT SHARED TIME 31-45 MINUTES: Performed by: NURSE PRACTITIONER

## 2020-08-25 PROCEDURE — 3017F COLORECTAL CA SCREEN DOC REV: CPT | Performed by: INTERNAL MEDICINE

## 2020-08-25 PROCEDURE — G8427 DOCREV CUR MEDS BY ELIG CLIN: HCPCS | Performed by: INTERNAL MEDICINE

## 2020-08-25 PROCEDURE — 4004F PT TOBACCO SCREEN RCVD TLK: CPT | Performed by: INTERNAL MEDICINE

## 2020-08-25 PROCEDURE — 99204 OFFICE O/P NEW MOD 45 MIN: CPT | Performed by: INTERNAL MEDICINE

## 2020-08-25 RX ORDER — LACTOSE-REDUCED FOOD
1 LIQUID (ML) ORAL DAILY
Qty: 30 CAN | Refills: 3 | Status: SHIPPED | OUTPATIENT
Start: 2020-08-25 | End: 2020-09-24

## 2020-08-25 ASSESSMENT — ENCOUNTER SYMPTOMS
SHORTNESS OF BREATH: 0
SINUS PAIN: 1
SINUS PRESSURE: 1
SORE THROAT: 1
ABDOMINAL DISTENTION: 0
NAUSEA: 0
ANAL BLEEDING: 1
VOICE CHANGE: 1
RECTAL PAIN: 0
WHEEZING: 0
COUGH: 1
CHOKING: 0
ABDOMINAL PAIN: 0
BLOOD IN STOOL: 0
TROUBLE SWALLOWING: 1
CONSTIPATION: 0
DIARRHEA: 0
VOMITING: 0

## 2020-08-25 NOTE — PROGRESS NOTES
Reason for Referral:   REESE Harvey - GIANFRANCO  [facility], [address]      Chief Complaint   Patient presents with    Constipation     New patient. Patient stated that hea was constipation started about 2 months ago. Patient states that he was taking stool softners and prune juice. Patient stated he is regular as of today. Patient stated that he stopped milk and certain medication and seemed to help. New patient being seen for constipation. Patient reports that up until a few weeks ago he had significant constipation. Had straining with bowel movements. However, since stopping his anticholinergic medication, Cogentin, he has had resolution of his constipation. Patient is also taking prune juice and stool softeners daily. Denies any straining, tenesmus, diarrhea. No melena, hematochezia. Patient had colonoscopy in 2014 revealing mild diverticulosis. No polyps or lesions seen. Patient has fair appetite. No nausea, vomiting. No weight loss. Appears to be mildly malnourished. Denies dysphagia, odynophagia, dyspeptic symptoms. Denies any history of liver disease. Patient is recovering alcoholic. Noted to have elevated LFTs in 2019. No current anticoagulation or antiplatelet therapy. Denies any significant NSAID use. Past Medical,Family, and Social History reviewed and does contribute to the patient presentingcondition. Patient's PMH/PSH,SH,PSYCH Hx, MEDs, ALLERGIES, and ROS were all reviewed and updated in the appropriate sections.     PAST MEDICAL HISTORY:  Past Medical History:   Diagnosis Date    Depression     Fatty liver 8/26/2020    Hemorrhoids     Hx of blood clots     dvt and PE    Schizophrenia ([facility] [address]        Past Surgical History:   Procedure Laterality Date    COLONOSCOPY  5-28-14    normal exam, mild diverticulosis    OTHER SURGICAL HISTORY      jugular and carotid artery repair s/p suicide attempt       CURRENT MEDICATIONS:    Current Outpatient Medications:     Nutritional Supplements (ENSURE PLUS) LIQD, Take 1 Can by mouth daily, Disp: 30 Can, Rfl: 3    sennosides-docusate sodium (SENOKOT-S) 8.6-50 MG tablet, TAKE 2 TABLETS EVERY 12 HOURS, Disp: 30 tablet, Rfl: 2    hydrocortisone 1 % cream, Apply  topically 2 times daily. Apply topically 2 times daily. , Disp: , Rfl:     ABILIFY 30 MG tablet, Take 1 tablet by mouth daily. , Disp: , Rfl:     benztropine (COGENTIN) 0.5 MG tablet, Take 1 tablet by mouth 2 times daily. , Disp: , Rfl:     CYMBALTA 60 MG capsule, Take 1 capsule by mouth daily. , Disp: , Rfl:     traZODone (DESYREL) 150 MG tablet, Take 1 tablet by mouth 2 times daily. , Disp: , Rfl:     trifluoperazine (STELAZINE) 10 MG tablet, Take 1 tablet by mouth daily. , Disp: , Rfl:     trifluoperazine (STELAZINE) 5 MG tablet, Take 1 tablet by mouth 2 times daily. , Disp: , Rfl:     ALLERGIES:   No Known Allergies    FAMILY HISTORY:       Problem Relation Age of Onset    Cancer Mother     Cancer Brother         breast cancer         SOCIAL HISTORY:   Social History     Socioeconomic History    Marital status: Single     Spouse name: Not on file    Number of children: Not on file    Years of education: Not on file    Highest education level: Not on file   Occupational History    Not on file   Social Needs    Financial resource strain: Not on file    Food insecurity     Worry: Not on file     Inability: Not on file    Transportation needs     Medical: Not on file     Non-medical: Not on file   Tobacco Use    Smoking status: Current Every Day Smoker     Packs/day: 0.00     Years: 39.00     Pack years: 0.00     Types: Cigars    Smokeless tobacco: Never Used    Tobacco comment: 6 or 7 cigars   Substance and Sexual Activity    Alcohol use: Yes     Comment: 8 times/week    Drug use: No    Sexual activity: Not on file   Lifestyle    Physical activity     Days per week: Not on file     Minutes per session: Not on file    Stress: Not on file   Relationships    Social connections     Talks on phone: Not on file     Gets together: Not on file     Attends Anglican service: Not on file     Active member of club or organization: Not on file     Attends meetings of clubs or organizations: Not on file     Relationship status: Not on file    Intimate partner violence     Fear of current or ex partner: Not on file     Emotionally abused: Not on file     Physically abused: Not on file     Forced sexual activity: Not on file   Other Topics Concern    Not on file   Social History Narrative    Not on file       REVIEW OF SYSTEMS:       Review of Systems   Constitutional: Positive for appetite change (decreased) and fatigue. Negative for unexpected weight change. HENT: Positive for postnasal drip, sinus pressure, sinus pain, sore throat, trouble swallowing and voice change. Respiratory: Positive for cough (smoker ). Negative for choking, shortness of breath and wheezing. Cardiovascular: Negative for chest pain, palpitations and leg swelling. Gastrointestinal: Positive for anal bleeding (hemorrhoids). Negative for abdominal distention, abdominal pain, blood in stool, constipation, diarrhea, nausea, rectal pain and vomiting. Genitourinary: Positive for frequency. Negative for difficulty urinating. Allergic/Immunologic: Negative for environmental allergies and food allergies. Neurological: Positive for dizziness and light-headedness. Negative for weakness, numbness and headaches. Hematological: Does not bruise/bleed easily. Psychiatric/Behavioral: Positive for sleep disturbance. The patient is nervous/anxious. PHYSICAL EXAMINATION: Vital signs reviewed per the nursing documentation. Temp 97.2 °F (36.2 °C) Comment:  97.3 temp  Ht 6' (1.829 m)   Wt 160 lb 9.6 oz (72.8 kg)   BMI 21.78 kg/m²   Body mass index is 21.78 kg/m². Physical Exam  Vitals signs and nursing note reviewed.    Constitutional:       General: He is not in acute distress. Appearance: He is well-developed. HENT:      Head: Normocephalic and atraumatic. Mouth/Throat:      Pharynx: No oropharyngeal exudate. Eyes:      General: No scleral icterus. Conjunctiva/sclera: Conjunctivae normal.      Pupils: Pupils are equal, round, and reactive to light. Neck:      Musculoskeletal: Normal range of motion and neck supple. Thyroid: No thyromegaly. Trachea: No tracheal deviation. Cardiovascular:      Rate and Rhythm: Normal rate and regular rhythm. Heart sounds: Normal heart sounds. No murmur. Pulmonary:      Effort: Pulmonary effort is normal. No respiratory distress. Breath sounds: Normal breath sounds. No wheezing or rales. Abdominal:      General: Bowel sounds are normal. There is no distension. Palpations: Abdomen is soft. There is no hepatomegaly or mass. Tenderness: There is no abdominal tenderness. There is no guarding or rebound. Hernia: No hernia is present. Genitourinary:     Rectum: Normal.   Lymphadenopathy:      Cervical: No cervical adenopathy. Skin:     General: Skin is warm and dry. Findings: No erythema or rash. Neurological:      Mental Status: He is alert and oriented to person, place, and time. Cranial Nerves: No cranial nerve deficit. Psychiatric:         Thought Content:  Thought content normal.           LABORATORY DATA: Reviewed  Lab Results   Component Value Date    WBC 3.8 09/05/2019    HGB 13.7 09/05/2019    HCT 40.8 09/05/2019    MCV 94.4 09/05/2019    PLT See Reflexed IPF Result 09/05/2019     08/11/2020    K 4.1 08/11/2020     08/11/2020    CO2 22 08/11/2020    BUN 5 (L) 08/11/2020    CREATININE 0.68 (L) 08/11/2020    LABALBU 4.3 09/05/2019    BILITOT 0.43 09/05/2019    ALKPHOS 154 (H) 09/25/2019     (H) 09/05/2019    ALT 54 (H) 09/05/2019    INR 1.0 08/17/2018         Lab Results   Component Value Date    RBC 4.32 09/05/2019    HGB 13.7 09/05/2019    MCV 94.4 09/05/2019    MCH 31.7 09/05/2019    MCHC 33.6 09/05/2019    RDW 13.9 09/05/2019    MPV NOT REPORTED 09/05/2019    BASOPCT 1 09/05/2019    LYMPHSABS 0.61 (L) 09/05/2019    MONOSABS 0.53 09/05/2019    NEUTROABS 2.58 09/05/2019    EOSABS 0.04 09/05/2019    BASOSABS 0.04 09/05/2019         DIAGNOSTIC TESTING:     No results found. IMPRESSION:     Assessment:  1. Fatty liver    2. Constipation, unspecified constipation type        Plan: At present patient is stable. Initially patient referred for constipation. However this is resolved after stopping his anticholinergic medications. Patient has having regular formed bowel movements daily without straining. No melena, hematochezia. Advised fiber supplementation. Patient has history of alcoholism in the past.  Has history of elevated LFTs. We will get further liver disease work-up as ordered above. To abstain from alcohol    Follow-up 3 months    Spent 30 minutes providing patient education and counseling. Thank you for allowing me to participate in the care of Mr. Kristan Bernal. For any further questions please do not hesitate to contact me. I independently performed an evaluation on Yesenia Nascimento. .  I have reviewed the above documentation completed by the Nurse Practitioner and agree with the documented findings and plan of care. I have personally evaluated this patient with the APRN and have completed at least one if not all key elements of the E/M (history, physical exam, and MDM). Please see my additional contributions to the HPI, physical exam, assessment, and medical decision making. Discussed with the patient regarding symptoms. He stopped drinking alcohol. However he does drink intermittently. He did not have liver test done recently. Ultrasound of the liver done last year nonspecific. He appears to have mild malnutrition. Advised nutrition supplements and nutritious diet.   Advised to have labs as outlined above. At present abdominal examination nonspecific. After discussion patient understood and agreed with the plan. I have reviewed and agree with the MA/LPN ROS.      Gwen Link MD, [facility]  Board Certified in Gastroenterology and [address] [place] [address] Gastroenterology  Office #: [phone]

## 2020-08-26 PROBLEM — K59.00 CONSTIPATION: Status: ACTIVE | Noted: 2020-08-26

## 2020-08-26 PROBLEM — K76.0 FATTY LIVER: Status: ACTIVE | Noted: 2020-08-26

## 2020-08-28 ENCOUNTER — TELEPHONE (OUTPATIENT)
Dept: GASTROENTEROLOGY | Age: 60
End: 2020-08-28

## 2020-08-28 NOTE — TELEPHONE ENCOUNTER
Patient's  called and wanted  to see if Alonso Felix was able to get approval for patient to get ensure.     Routed to Alonso Felix

## 2020-09-01 NOTE — TELEPHONE ENCOUNTER
Patient was called on 09/01/02020 to let him know the his ensure drinks are covered by his insurance. Patient was told that he just needs to go to the pharmacy of is choice that accepts his insurance, take the ensure to the counter and they will run it through his insurance and it will be covered. Joseph Krabbe from [address] [place] [address]   Ref call # [phone] is who the writer spoke with to get the requirement to get the ensure covered. A message was also left on Alyse Garcia, , phone to call the office back at his earliest convenience.

## 2021-01-11 ENCOUNTER — APPOINTMENT (OUTPATIENT)
Dept: GENERAL RADIOLOGY | Age: 61
End: 2021-01-11
Payer: COMMERCIAL

## 2021-01-11 ENCOUNTER — HOSPITAL ENCOUNTER (EMERGENCY)
Age: 61
Discharge: HOME | End: 2021-01-11
Attending: EMERGENCY MEDICINE
Payer: COMMERCIAL

## 2021-01-11 VITALS
OXYGEN SATURATION: 96 % | SYSTOLIC BLOOD PRESSURE: 121 MMHG | HEART RATE: 71 BPM | DIASTOLIC BLOOD PRESSURE: 76 MMHG | RESPIRATION RATE: 15 BRPM

## 2021-01-11 DIAGNOSIS — I95.89 HYPOTENSION DUE TO HYPOVOLEMIA: ICD-10-CM

## 2021-01-11 DIAGNOSIS — E86.0 DEHYDRATION: Primary | ICD-10-CM

## 2021-01-11 DIAGNOSIS — E86.1 HYPOTENSION DUE TO HYPOVOLEMIA: ICD-10-CM

## 2021-01-11 LAB
ABSOLUTE EOS #: 0.14 K/UL (ref 0–0.44)
ABSOLUTE IMMATURE GRANULOCYTE: <0.03 K/UL (ref 0–0.3)
ABSOLUTE LYMPH #: 1.43 K/UL (ref 1.1–3.7)
ABSOLUTE MONO #: 0.58 K/UL (ref 0.1–1.2)
ALBUMIN SERPL-MCNC: 3.6 G/DL (ref 3.5–5.2)
ALBUMIN/GLOBULIN RATIO: 1.2 (ref 1–2.5)
ALP BLD-CCNC: 168 U/L (ref 40–129)
ALT SERPL-CCNC: 98 U/L (ref 5–41)
ANION GAP SERPL CALCULATED.3IONS-SCNC: 13 MMOL/L (ref 9–17)
AST SERPL-CCNC: 222 U/L
BASOPHILS # BLD: 1 % (ref 0–2)
BASOPHILS ABSOLUTE: <0.03 K/UL (ref 0–0.2)
BILIRUB SERPL-MCNC: 0.41 MG/DL (ref 0.3–1.2)
BUN BLDV-MCNC: 4 MG/DL (ref 6–20)
BUN/CREAT BLD: ABNORMAL (ref 9–20)
CALCIUM SERPL-MCNC: 8.6 MG/DL (ref 8.6–10.4)
CHLORIDE BLD-SCNC: 110 MMOL/L (ref 98–107)
CO2: 20 MMOL/L (ref 20–31)
CREAT SERPL-MCNC: 0.59 MG/DL (ref 0.7–1.2)
DIFFERENTIAL TYPE: ABNORMAL
EOSINOPHILS RELATIVE PERCENT: 4 % (ref 1–4)
GFR AFRICAN AMERICAN: >60 ML/MIN
GFR NON-AFRICAN AMERICAN: >60 ML/MIN
GFR SERPL CREATININE-BSD FRML MDRD: ABNORMAL ML/MIN/{1.73_M2}
GFR SERPL CREATININE-BSD FRML MDRD: ABNORMAL ML/MIN/{1.73_M2}
GLUCOSE BLD-MCNC: 117 MG/DL (ref 70–99)
HCT VFR BLD CALC: 36.4 % (ref 40.7–50.3)
HEMOGLOBIN: 12.1 G/DL (ref 13–17)
IMMATURE GRANULOCYTES: 0 %
LIPASE: 32 U/L (ref 13–60)
LYMPHOCYTES # BLD: 37 % (ref 24–43)
MAGNESIUM: 1.6 MG/DL (ref 1.6–2.6)
MCH RBC QN AUTO: 32.7 PG (ref 25.2–33.5)
MCHC RBC AUTO-ENTMCNC: 33.2 G/DL (ref 28.4–34.8)
MCV RBC AUTO: 98.4 FL (ref 82.6–102.9)
MONOCYTES # BLD: 15 % (ref 3–12)
NRBC AUTOMATED: 0 PER 100 WBC
PDW BLD-RTO: 13.5 % (ref 11.8–14.4)
PLATELET # BLD: 149 K/UL (ref 138–453)
PLATELET ESTIMATE: ABNORMAL
PMV BLD AUTO: 10.4 FL (ref 8.1–13.5)
POTASSIUM SERPL-SCNC: 3.1 MMOL/L (ref 3.7–5.3)
RBC # BLD: 3.7 M/UL (ref 4.21–5.77)
RBC # BLD: ABNORMAL 10*6/UL
SEG NEUTROPHILS: 43 % (ref 36–65)
SEGMENTED NEUTROPHILS ABSOLUTE COUNT: 1.66 K/UL (ref 1.5–8.1)
SODIUM BLD-SCNC: 143 MMOL/L (ref 135–144)
TOTAL PROTEIN: 6.7 G/DL (ref 6.4–8.3)
TROPONIN INTERP: NORMAL
TROPONIN T: NORMAL NG/ML
TROPONIN, HIGH SENSITIVITY: 15 NG/L (ref 0–22)
WBC # BLD: 3.8 K/UL (ref 3.5–11.3)
WBC # BLD: ABNORMAL 10*3/UL

## 2021-01-11 PROCEDURE — 80053 COMPREHEN METABOLIC PANEL: CPT

## 2021-01-11 PROCEDURE — 71045 X-RAY EXAM CHEST 1 VIEW: CPT

## 2021-01-11 PROCEDURE — 6370000000 HC RX 637 (ALT 250 FOR IP): Performed by: STUDENT IN AN ORGANIZED HEALTH CARE EDUCATION/TRAINING PROGRAM

## 2021-01-11 PROCEDURE — 85025 COMPLETE CBC W/AUTO DIFF WBC: CPT

## 2021-01-11 PROCEDURE — 93005 ELECTROCARDIOGRAM TRACING: CPT | Performed by: STUDENT IN AN ORGANIZED HEALTH CARE EDUCATION/TRAINING PROGRAM

## 2021-01-11 PROCEDURE — 83690 ASSAY OF LIPASE: CPT

## 2021-01-11 PROCEDURE — 96360 HYDRATION IV INFUSION INIT: CPT

## 2021-01-11 PROCEDURE — 2580000003 HC RX 258: Performed by: STUDENT IN AN ORGANIZED HEALTH CARE EDUCATION/TRAINING PROGRAM

## 2021-01-11 PROCEDURE — 99285 EMERGENCY DEPT VISIT HI MDM: CPT

## 2021-01-11 PROCEDURE — 84484 ASSAY OF TROPONIN QUANT: CPT

## 2021-01-11 PROCEDURE — 83735 ASSAY OF MAGNESIUM: CPT

## 2021-01-11 RX ORDER — POTASSIUM CHLORIDE 20 MEQ/1
40 TABLET, EXTENDED RELEASE ORAL ONCE
Status: COMPLETED | OUTPATIENT
Start: 2021-01-11 | End: 2021-01-11

## 2021-01-11 RX ORDER — 0.9 % SODIUM CHLORIDE 0.9 %
1000 INTRAVENOUS SOLUTION INTRAVENOUS ONCE
Status: COMPLETED | OUTPATIENT
Start: 2021-01-11 | End: 2021-01-11

## 2021-01-11 RX ADMIN — POTASSIUM CHLORIDE 40 MEQ: 1500 TABLET, EXTENDED RELEASE ORAL at 12:42

## 2021-01-11 RX ADMIN — SODIUM CHLORIDE 1000 ML: 9 INJECTION, SOLUTION INTRAVENOUS at 11:18

## 2021-01-11 ASSESSMENT — ENCOUNTER SYMPTOMS
COUGH: 0
DIARRHEA: 1
RHINORRHEA: 0
NAUSEA: 1
ABDOMINAL PAIN: 0
CONSTIPATION: 0
VOMITING: 1
EYE PAIN: 0
SHORTNESS OF BREATH: 0
EYE DISCHARGE: 0

## 2021-01-11 NOTE — ED PROVIDER NOTES
[facility]     Emergency Department     Faculty Note/ Attestation      Pt Name: Sandy Leone. MRN: [id number]  Birthdate 1960  Date of evaluation: 1/11/2021  Patients PCP:    REESE Zavala CNP    Attestation  I performed a history and physical examination of the patient/ or directly observed  and discussed management with the resident. I reviewed the residents note and agree with the documented findings and plan of care. Any areas of disagreement are noted on the chart. I was personally present for the key portions of any procedures. I have documented in the chart those procedures where I was not present during the key portions. I have reviewed the emergency nurses triage note. I agree with the chief complaint, past medical history, past surgical history, allergies, medications, social and family history as documented unless otherwise noted below. For Physician Assistant/ Nurse Practitioner cases/documentation I have personally evaluated this patient and have completed at least one if not all key elements of the E/M (history, physical exam, and MDM). Additional findings are as noted. This patient was evaluated in the Emergency Department for symptoms described in the history of present illness. The patient was evaluated in the context of the global COVID-19 pandemic, which necessitated consideration that the patient might be at risk for infection with the SARS-CoV-2 virus that causes COVID-19. Institutional protocols and algorithms that pertain to the evaluation of patients at risk for COVID-19 are in a state of rapid change based on information released by regulatory bodies including the CDC and federal and state organizations. These policies and algorithms were followed during the patient's care in the ED.      Initial Screens:        [place] Coma Scale  Eye Opening: Spontaneous  Best Verbal Response: Oriented  Best Motor

## 2021-01-11 NOTE — ED PROVIDER NOTES
[facility]  ED  Emergency Department Encounter  Emergency Medicine Resident     Pt Name: Quin Silvestre MRN: [id number]  Birthdate 1960  Date of evaluation: 1/11/21  PCP:  REESE Radford CNP    CHIEF COMPLAINT       Chief Complaint   Patient presents with    Nausea & Vomiting     x3 months       HISTORY OFPRESENT ILLNESS  (Location/Symptom, Timing/Onset, Context/Setting, Quality, Duration, Modifying Factors,Severity.)      Quin Silvestre is a 64 y. o.yo male who presents with acute onset of hypotension when he went to his [facility] appointment. Patient was noted to be hypotensive upon initial vitals at the [facility]. Patient was following up after having some dizziness from his psych medications. Patient notes that he had some constipation due to his medication and so he started eating prunes 2 times daily. He then subsequently had diarrhea, nausea, vomiting past few weeks. He had decreased oral intake due to abdominal pain and nausea vomiting diarrhea. Patient generally feeling weak. Although he stopped taking the prunes. Has any chest pain, shortness of breath, abdominal pain at this time. Denies any falls or trauma. Does not have any pain currently, no radiation of pain. PAST MEDICAL / SURGICAL / SOCIAL / FAMILY HISTORY      has a past medical history of Depression, Fatty liver, Hemorrhoids, Hx of blood clots, and Schizophrenia ([address] has a past surgical history that includes other surgical history and Colonoscopy (5-28-14). Social:  reports that he has been smoking cigars. He has been smoking about 0.00 packs per day for the past 39.00 years. He has never used smokeless tobacco. He reports current alcohol use. He reports that he does not use drugs. Family Hx:   Family History   Problem Relation Age of Onset    Cancer Mother     Cancer Brother         breast cancer        Allergies:  Patient has no known allergies.     Home Medications:  Prior Exam  Vitals signs and nursing note reviewed. Constitutional:       General: He is not in acute distress. Appearance: Normal appearance. He is not ill-appearing. HENT:      Head: Normocephalic and atraumatic. Nose: Nose normal.      Mouth/Throat:      Mouth: Mucous membranes are moist.      Pharynx: Oropharynx is clear. Eyes:      General:         Right eye: No discharge. Left eye: No discharge. Cardiovascular:      Rate and Rhythm: Normal rate and regular rhythm. Heart sounds: No murmur. No friction rub. No gallop. Pulmonary:      Effort: Pulmonary effort is normal.      Breath sounds: Normal breath sounds. No wheezing. Abdominal:      General: Abdomen is flat. Palpations: Abdomen is soft. Tenderness: There is no abdominal tenderness. Musculoskeletal:      Right lower leg: No edema. Left lower leg: No edema. Comments: Moving all extremities equally. Skin:     General: Skin is warm and dry. Capillary Refill: Capillary refill takes less than 2 seconds. Neurological:      General: No focal deficit present. Mental Status: He is alert and oriented to person, place, and time. Comments: Answering all questions appropriately. Psychiatric:         Mood and Affect: Mood normal.         Behavior: Behavior normal.         Thought Content: Thought content normal.         DIFFERENTIAL  DIAGNOSIS       DDX: Dehydration versus gastritis versus constipation versus diarrhea due to gastritis versus sepsis versus other    Initial MDM/Plan: 64 y.o. male who presents with acute onset of hypotension after having nausea, vomiting, diarrhea for the past 3 weeks. Patient presents via EMS alert, oriented, answering all questions appropriately with IV fluids running. Patient stating that he already feels better with IV fluids running. He has been eating prunes and having diarrhea, nausea, vomiting for the past few weeks with decreased oral intake.   Patient denies any pain currently and states he is already feeling better. CBC unremarkable, no signs of infection, no signs of anemia. CMP showing elevated liver function enzymes, did discuss with patient and patient advocate at bedside patient is establishing care with a new primary care provider today and they will bring this up that we recommend that patient should have repeat liver function enzymes in the next month or so per primary care team.  Troponin negative, no acute abnormalities. EKG was negative no acute abnormalities. Chest X-ray showing no acute abnormalities patient feeling much better after IV fluid resuscitation. Patient asking and tolerating oral intake at the time of my reassessment. Patient was likely dehydrated due to diarrhea and vomiting, hypotension due to dehydration which subsequently did resolve after IV fluid resuscitation [facility] advocate present at bedside and is taking patient to his next primary care provider appointment, she is compliant and understanding of plan for discharge. Strict return precautions provided. Patient compliant and understanding of discharge instructions. Patient was stable throughout emergency department stay and was discharged in stable condition with plan for follow-up.     DIAGNOSTIC RESULTS / EMERGENCYDEPARTMENT COURSE / MDM     LABS:  Labs Reviewed   CBC WITH AUTO DIFFERENTIAL - Abnormal; Notable for the following components:       Result Value    RBC 3.70 (*)     Hemoglobin 12.1 (*)     Hematocrit 36.4 (*)     Monocytes 15 (*)     All other components within normal limits   COMPREHENSIVE METABOLIC PANEL W/ REFLEX TO MG FOR LOW K - Abnormal; Notable for the following components:    Glucose 117 (*)     BUN 4 (*)     CREATININE 0.59 (*)     Potassium 3.1 (*)     Chloride 110 (*)     Alkaline Phosphatase 168 (*)     ALT 98 (*)      (*)     All other components within normal limits   TROPONIN   LIPASE   MAGNESIUM         RADIOLOGY:  Xr Chest Portable    Result Date: 1/11/2021  EXAMINATION: ONE XRAY VIEW OF THE CHEST 1/11/2021 11:15 am COMPARISON: May 22, 2019, chest exam HISTORY: ORDERING SYSTEM PROVIDED HISTORY: hypotension TECHNOLOGIST PROVIDED HISTORY: hypotension Reason for Exam: Hypotension FINDINGS: Stable normal cardiopericardial silhouette Mild tortuosity of the thoracic aorta There are no significant mediastinal, pleural or parenchymal findings There are degenerative changes of the thoracic spine     No acute cardiopulmonary findings       EKG  Normal sinus rhythm,  Axis normal, no deviation noted  Intervals within normal limits including MS, QRS, QT/QTc  ST segment elevation, no ST segment depression, no T wave inversion noted. Good R wave progression. No acute abnormalities. All EKG's are interpreted by the Emergency Department Physicianwho either signs or Co-signs this chart in the absence of a cardiologist.    EMERGENCY DEPARTMENT COURSE:    See MDM. Patient was initially hypotensive, hypotension did resolve and patient stabilized with IV fluid resuscitation, labs as in MDM. Patient was discharged in stable condition with plan for follow-up with primary care provider for repeat liver function enzymes. Patient compliant and understanding of this plan with [facility] patient advocate at bedside who is also complaining understanding of this plan. PROCEDURES:  None    CONSULTS:  IP CONSULT TO PRIMARY CARE PROVIDER      FINAL IMPRESSION      1. Dehydration    2.  Hypotension due to hypovolemia          DISPOSITION / PLAN     DISPOSITION Decision To Discharge 01/11/2021 12:35:50 PM      PATIENT REFERRED TO:  REESE Grover CNP  [address]  [phone]    Schedule an appointment as soon as possible for a visit   For follow up for repeat liver function enzymes    [facility]  [address] [place] [address]  [phone]    As needed, If symptoms worsen      DISCHARGE MEDICATIONS:  Discharge Medication List as of 1/11/2021 12:40 PM          Óscar Mcdonnell DO  Emergency Medicine Resident    (Please note that portions of this note were completed with a voice recognition program.Efforts were made to edit the dictations but occasionally words are mis-transcribed.)       Óscar Mcdonnell DO  Resident  01/11/21 2042

## 2021-01-11 NOTE — ED PROVIDER NOTES
[facility] ED  Emergency Department  Senior Resident Attestation     Primary Care Physician  Itzel Lambert, REESE - CNP    I performed a history and physical examination of the patient and discussed management with the liz resident. I reviewed the liz residents note and agree with the documented findings and plan of care. Any areas of disagreement are noted on the chart. Case was then discussed with Faculty Attending Supervisor for additional medical management. PERTINENT ATTENDING PHYSICIAN COMMENTS:    HISTORY:   Jon Medina is a 64 y.o. male who  has a past medical history of Depression, Fatty liver (8/26/2020), Hemorrhoids, blood clots, and Schizophrenia ([facility] [address] and presents with complaint of diarrhea, generalized body pain, hypotension per rescue. Patient denies any symptoms currently but per EMS he was hypotensive. Patient notes that he has been having diarrhea for 3 months but states that he has been drinking lots of prune juice and taking fiber as he used to have constipation from his medications. Plan for labs and fluids, EKG, chest x-ray and discharged home if appropriate resolution.     PHYSICAL:    ,  Pulse: 74, Resp: 17, BP: 98/67, SpO2: 98 %  Gen: Non-toxic, Afebrile  Neck: Supple  Cards: Regular rate and rhythm  Pulm: Lung sounds clear to auscultation  Abdomen: Soft, non-tender, non-distended  Skin: warm, dry  Extremities: pulses 2+ radial / dorsalis pedis, no clubbing, cyanosis, edema    IMPRESSION:   Diarrhea  Dehydration    PLAN:   Abdominal labs negative  Patient rehydrated with IV fluids with resolution of hypotension  Discharge home with follow-up with PCP    CRITICAL CARE TIME:    None    Bassem Love DO  Senior Resident Physician    (Please note that portions of this note were completed with a voice recognition program.  Efforts were made to edit the dictations but occasionally words are mis-transcribed.)       Mone Cotto DO  Resident  01/11/21 [address] [place] [address]

## 2021-01-11 NOTE — ED NOTES
Bed: 14  Expected date:   Expected time:   Means of arrival:   Comments:  [address], RN  01/11/21 1016

## 2021-01-11 NOTE — ED NOTES
Pt came to ED via life [facility] from [facility] c/o N/V/D x3 months. Pt was hypotensive with life squad and on rescue guard that was removed once pt arrived to room 14. Pt has history of skitsophrenia. Pt is a&o x4. Pt was slightly hypotensive upon arrival. Pt is in no respiratory distress. All vitals are stable.       Johnny Lewis, RN  01/11/21 1116

## 2021-01-12 LAB
EKG ATRIAL RATE: 68 BPM
EKG P AXIS: 77 DEGREES
EKG P-R INTERVAL: 210 MS
EKG Q-T INTERVAL: 468 MS
EKG QRS DURATION: 148 MS
EKG QTC CALCULATION (BAZETT): 497 MS
EKG R AXIS: 86 DEGREES
EKG T AXIS: 60 DEGREES
EKG VENTRICULAR RATE: 68 BPM

## 2021-01-12 PROCEDURE — 93010 ELECTROCARDIOGRAM REPORT: CPT | Performed by: INTERNAL MEDICINE

## 2022-07-01 ENCOUNTER — HOSPITAL ENCOUNTER (OUTPATIENT)
Age: 62
Discharge: HOME | End: 2022-07-01
Payer: COMMERCIAL

## 2022-07-01 LAB
ABSOLUTE EOS #: 0.16 K/UL (ref 0–0.44)
ABSOLUTE IMMATURE GRANULOCYTE: <0.03 K/UL (ref 0–0.3)
ABSOLUTE LYMPH #: 1.22 K/UL (ref 1.1–3.7)
ABSOLUTE MONO #: 0.64 K/UL (ref 0.1–1.2)
ALBUMIN SERPL-MCNC: 4.1 G/DL (ref 3.5–5.2)
ALBUMIN/GLOBULIN RATIO: 1.1 (ref 1–2.5)
ALP BLD-CCNC: 154 U/L (ref 40–129)
ALT SERPL-CCNC: 35 U/L (ref 5–41)
ANION GAP SERPL CALCULATED.3IONS-SCNC: 15 MMOL/L (ref 9–17)
AST SERPL-CCNC: 73 U/L
BASOPHILS # BLD: 1 % (ref 0–2)
BASOPHILS ABSOLUTE: 0.03 K/UL (ref 0–0.2)
BILIRUB SERPL-MCNC: 0.27 MG/DL (ref 0.3–1.2)
BUN BLDV-MCNC: 3 MG/DL (ref 6–20)
CALCIUM SERPL-MCNC: 9.4 MG/DL (ref 8.6–10.4)
CHLORIDE BLD-SCNC: 104 MMOL/L (ref 98–107)
CHOLESTEROL/HDL RATIO: 2.9
CHOLESTEROL: 142 MG/DL
CO2: 22 MMOL/L (ref 20–31)
CREAT SERPL-MCNC: 0.65 MG/DL (ref 0.7–1.2)
EOSINOPHILS RELATIVE PERCENT: 3 % (ref 1–4)
GFR AFRICAN AMERICAN: >60 ML/MIN
GFR NON-AFRICAN AMERICAN: >60 ML/MIN
GFR SERPL CREATININE-BSD FRML MDRD: ABNORMAL ML/MIN/{1.73_M2}
GLUCOSE BLD-MCNC: 86 MG/DL (ref 70–99)
HCT VFR BLD CALC: 39.2 % (ref 40.7–50.3)
HDLC SERPL-MCNC: 49 MG/DL
HEMOGLOBIN: 12.9 G/DL (ref 13–17)
IMMATURE GRANULOCYTES: 0 %
LDL CHOLESTEROL: 73 MG/DL (ref 0–130)
LYMPHOCYTES # BLD: 20 % (ref 24–43)
MCH RBC QN AUTO: 33.1 PG (ref 25.2–33.5)
MCHC RBC AUTO-ENTMCNC: 32.9 G/DL (ref 28.4–34.8)
MCV RBC AUTO: 100.5 FL (ref 82.6–102.9)
MONOCYTES # BLD: 11 % (ref 3–12)
NRBC AUTOMATED: 0 PER 100 WBC
PDW BLD-RTO: 13.2 % (ref 11.8–14.4)
PLATELET # BLD: 201 K/UL (ref 138–453)
PMV BLD AUTO: 9.9 FL (ref 8.1–13.5)
POTASSIUM SERPL-SCNC: 3.9 MMOL/L (ref 3.7–5.3)
RBC # BLD: 3.9 M/UL (ref 4.21–5.77)
SEG NEUTROPHILS: 65 % (ref 36–65)
SEGMENTED NEUTROPHILS ABSOLUTE COUNT: 4.02 K/UL (ref 1.5–8.1)
SODIUM BLD-SCNC: 141 MMOL/L (ref 135–144)
T3 FREE: 2.79 PG/ML (ref 2.02–4.43)
THYROXINE, FREE: 0.76 NG/DL (ref 0.93–1.7)
TOTAL PROTEIN: 8 G/DL (ref 6.4–8.3)
TRIGL SERPL-MCNC: 100 MG/DL
TSH SERPL DL<=0.05 MIU/L-ACNC: 0.81 UIU/ML (ref 0.3–5)
WBC # BLD: 6.1 K/UL (ref 3.5–11.3)

## 2022-07-01 PROCEDURE — 85025 COMPLETE CBC W/AUTO DIFF WBC: CPT

## 2022-07-01 PROCEDURE — 84443 ASSAY THYROID STIM HORMONE: CPT

## 2022-07-01 PROCEDURE — 36415 COLL VENOUS BLD VENIPUNCTURE: CPT

## 2022-07-01 PROCEDURE — 84439 ASSAY OF FREE THYROXINE: CPT

## 2022-07-01 PROCEDURE — 80061 LIPID PANEL: CPT

## 2022-07-01 PROCEDURE — 80053 COMPREHEN METABOLIC PANEL: CPT

## 2022-07-01 PROCEDURE — 84481 FREE ASSAY (FT-3): CPT

## 2022-07-01 PROCEDURE — 83036 HEMOGLOBIN GLYCOSYLATED A1C: CPT

## 2022-07-03 LAB
ESTIMATED AVERAGE GLUCOSE: 120 MG/DL
HBA1C MFR BLD: 5.8 % (ref 4–6)

## 2022-07-05 LAB
EKG ATRIAL RATE: 68 BPM
EKG P AXIS: 65 DEGREES
EKG P-R INTERVAL: 186 MS
EKG Q-T INTERVAL: 450 MS
EKG QRS DURATION: 142 MS
EKG QTC CALCULATION (BAZETT): 478 MS
EKG R AXIS: 89 DEGREES
EKG T AXIS: 46 DEGREES
EKG VENTRICULAR RATE: 68 BPM

## 2024-03-22 ENCOUNTER — HOSPITAL ENCOUNTER (OUTPATIENT)
Age: 64
Discharge: HOME | End: 2024-03-22
Payer: COMMERCIAL

## 2024-03-22 LAB
25(OH)D3 SERPL-MCNC: 26.2 NG/ML (ref 30–100)
ALBUMIN SERPL-MCNC: 4.3 G/DL (ref 3.5–5.2)
ALBUMIN/GLOB SERPL: 1 {RATIO} (ref 1–2.5)
ALP SERPL-CCNC: 178 U/L (ref 40–129)
ALT SERPL-CCNC: 38 U/L (ref 10–50)
ANION GAP SERPL CALCULATED.3IONS-SCNC: 13 MMOL/L (ref 9–16)
AST SERPL-CCNC: 126 U/L (ref 10–50)
BASOPHILS # BLD: 0.03 K/UL (ref 0–0.2)
BASOPHILS NFR BLD: 1 % (ref 0–2)
BILIRUB SERPL-MCNC: 0.5 MG/DL (ref 0–1.2)
BUN SERPL-MCNC: 2 MG/DL (ref 8–23)
CALCIUM SERPL-MCNC: 9.1 MG/DL (ref 8.6–10.4)
CHLORIDE SERPL-SCNC: 101 MMOL/L (ref 98–107)
CHOLEST SERPL-MCNC: 145 MG/DL (ref 0–199)
CHOLESTEROL/HDL RATIO: 2
CO2 SERPL-SCNC: 27 MMOL/L (ref 20–31)
CREAT SERPL-MCNC: 0.6 MG/DL (ref 0.7–1.2)
EOSINOPHIL # BLD: 0.29 K/UL (ref 0–0.44)
EOSINOPHILS RELATIVE PERCENT: 5 % (ref 1–4)
ERYTHROCYTE [DISTWIDTH] IN BLOOD BY AUTOMATED COUNT: 13.8 % (ref 11.8–14.4)
EST. AVERAGE GLUCOSE BLD GHB EST-MCNC: 105 MG/DL
GFR SERPL CREATININE-BSD FRML MDRD: >60 ML/MIN/1.73M2
GLUCOSE SERPL-MCNC: 111 MG/DL (ref 74–99)
HBA1C MFR BLD: 5.3 % (ref 4–6)
HCT VFR BLD AUTO: 39.1 % (ref 40.7–50.3)
HDLC SERPL-MCNC: 71 MG/DL
HGB BLD-MCNC: 13.4 G/DL (ref 13–17)
IMM GRANULOCYTES # BLD AUTO: <0.03 K/UL (ref 0–0.3)
IMM GRANULOCYTES NFR BLD: 0 %
LDLC SERPL CALC-MCNC: 62 MG/DL (ref 0–100)
LYMPHOCYTES NFR BLD: 1.5 K/UL (ref 1.1–3.7)
LYMPHOCYTES RELATIVE PERCENT: 28 % (ref 24–43)
MCH RBC QN AUTO: 33 PG (ref 25.2–33.5)
MCHC RBC AUTO-ENTMCNC: 34.3 G/DL (ref 28.4–34.8)
MCV RBC AUTO: 96.3 FL (ref 82.6–102.9)
MONOCYTES NFR BLD: 0.61 K/UL (ref 0.1–1.2)
MONOCYTES NFR BLD: 11 % (ref 3–12)
NEUTROPHILS NFR BLD: 54 % (ref 36–65)
NEUTS SEG NFR BLD: 2.89 K/UL (ref 1.5–8.1)
NRBC BLD-RTO: 0 PER 100 WBC
PLATELET # BLD AUTO: ABNORMAL K/UL (ref 138–453)
PLATELET, FLUORESCENCE: 148 K/UL (ref 138–453)
PLATELETS.RETICULATED NFR BLD AUTO: 4.4 % (ref 1.1–10.3)
POTASSIUM SERPL-SCNC: 4 MMOL/L (ref 3.7–5.3)
PROT SERPL-MCNC: 8.3 G/DL (ref 6.6–8.7)
PSA SERPL-MCNC: 0.7 NG/ML (ref 0–4)
RBC # BLD AUTO: 4.06 M/UL (ref 4.21–5.77)
SODIUM SERPL-SCNC: 141 MMOL/L (ref 136–145)
T4 FREE SERPL-MCNC: 0.8 NG/DL (ref 0.92–1.68)
TRIGL SERPL-MCNC: 61 MG/DL (ref 0–149)
TSH SERPL DL<=0.05 MIU/L-ACNC: 1.62 UIU/ML (ref 0.27–4.2)
VLDLC SERPL CALC-MCNC: 12 MG/DL
WBC OTHER # BLD: 5.3 K/UL (ref 3.5–11.3)

## 2024-03-22 PROCEDURE — 80053 COMPREHEN METABOLIC PANEL: CPT

## 2024-03-22 PROCEDURE — 36415 COLL VENOUS BLD VENIPUNCTURE: CPT

## 2024-03-22 PROCEDURE — G0103 PSA SCREENING: HCPCS

## 2024-03-22 PROCEDURE — 82306 VITAMIN D 25 HYDROXY: CPT

## 2024-03-22 PROCEDURE — 84439 ASSAY OF FREE THYROXINE: CPT

## 2024-03-22 PROCEDURE — 84443 ASSAY THYROID STIM HORMONE: CPT

## 2024-03-22 PROCEDURE — 80061 LIPID PANEL: CPT

## 2024-03-22 PROCEDURE — 85055 RETICULATED PLATELET ASSAY: CPT

## 2024-03-22 PROCEDURE — 85025 COMPLETE CBC W/AUTO DIFF WBC: CPT

## 2024-03-22 PROCEDURE — 83036 HEMOGLOBIN GLYCOSYLATED A1C: CPT

## 2025-05-22 ENCOUNTER — HOSPITAL ENCOUNTER (INPATIENT)
Age: 65
LOS: 6 days | Discharge: HOME | DRG: 750 | End: 2025-05-28
Attending: PSYCHIATRY & NEUROLOGY | Admitting: PSYCHIATRY & NEUROLOGY
Payer: COMMERCIAL

## 2025-05-22 PROBLEM — F23 ACUTE PSYCHOSIS (HCC): Status: ACTIVE | Noted: 2025-05-22

## 2025-05-22 LAB
ALBUMIN SERPL-MCNC: 3 G/DL (ref 3.5–5.2)
ALP SERPL-CCNC: 93 U/L (ref 40–129)
ALT SERPL-CCNC: 22 U/L (ref 10–50)
AST SERPL-CCNC: 35 U/L (ref 10–50)
BILIRUB DIRECT SERPL-MCNC: 0.1 MG/DL (ref 0–0.3)
BILIRUB INDIRECT SERPL-MCNC: ABNORMAL MG/DL (ref 0–1)
BILIRUB SERPL-MCNC: <0.2 MG/DL (ref 0–1.2)
PROT SERPL-MCNC: 6.5 G/DL (ref 6.6–8.7)

## 2025-05-22 PROCEDURE — 36415 COLL VENOUS BLD VENIPUNCTURE: CPT

## 2025-05-22 PROCEDURE — 99213 OFFICE O/P EST LOW 20 MIN: CPT

## 2025-05-22 PROCEDURE — 6370000000 HC RX 637 (ALT 250 FOR IP)

## 2025-05-22 PROCEDURE — 1240000000 HC EMOTIONAL WELLNESS R&B

## 2025-05-22 PROCEDURE — 93005 ELECTROCARDIOGRAM TRACING: CPT

## 2025-05-22 PROCEDURE — 80076 HEPATIC FUNCTION PANEL: CPT

## 2025-05-22 RX ORDER — POLYETHYLENE GLYCOL 3350 17 G/17G
17 POWDER, FOR SOLUTION ORAL DAILY PRN
Status: DISCONTINUED | OUTPATIENT
Start: 2025-05-22 | End: 2025-05-28 | Stop reason: HOSPADM

## 2025-05-22 RX ORDER — LORAZEPAM 2 MG/ML
2 INJECTION INTRAMUSCULAR EVERY 6 HOURS PRN
Status: DISCONTINUED | OUTPATIENT
Start: 2025-05-22 | End: 2025-05-22

## 2025-05-22 RX ORDER — MAGNESIUM HYDROXIDE/ALUMINUM HYDROXICE/SIMETHICONE 120; 1200; 1200 MG/30ML; MG/30ML; MG/30ML
30 SUSPENSION ORAL EVERY 6 HOURS PRN
Status: DISCONTINUED | OUTPATIENT
Start: 2025-05-22 | End: 2025-05-28 | Stop reason: HOSPADM

## 2025-05-22 RX ORDER — POLYETHYLENE GLYCOL 3350 17 G
2 POWDER IN PACKET (EA) ORAL
Status: DISCONTINUED | OUTPATIENT
Start: 2025-05-22 | End: 2025-05-28 | Stop reason: HOSPADM

## 2025-05-22 RX ORDER — TRAZODONE HYDROCHLORIDE 50 MG/1
50 TABLET ORAL NIGHTLY PRN
Status: DISCONTINUED | OUTPATIENT
Start: 2025-05-22 | End: 2025-05-22

## 2025-05-22 RX ORDER — M-VIT,TX,IRON,MINS/CALC/FOLIC 27MG-0.4MG
1 TABLET ORAL DAILY
Status: DISCONTINUED | OUTPATIENT
Start: 2025-05-22 | End: 2025-05-28 | Stop reason: HOSPADM

## 2025-05-22 RX ORDER — OLANZAPINE 10 MG/1
5 TABLET, ORALLY DISINTEGRATING ORAL 3 TIMES DAILY PRN
Status: DISCONTINUED | OUTPATIENT
Start: 2025-05-22 | End: 2025-05-28 | Stop reason: HOSPADM

## 2025-05-22 RX ORDER — IBUPROFEN 400 MG/1
400 TABLET, FILM COATED ORAL EVERY 6 HOURS PRN
Status: DISCONTINUED | OUTPATIENT
Start: 2025-05-22 | End: 2025-05-27

## 2025-05-22 RX ORDER — HYDROXYZINE HYDROCHLORIDE 50 MG/1
50 TABLET, FILM COATED ORAL 3 TIMES DAILY PRN
Status: DISCONTINUED | OUTPATIENT
Start: 2025-05-22 | End: 2025-05-22

## 2025-05-22 RX ORDER — HALOPERIDOL 5 MG/1
5 TABLET ORAL EVERY 6 HOURS PRN
Status: DISCONTINUED | OUTPATIENT
Start: 2025-05-22 | End: 2025-05-22

## 2025-05-22 RX ORDER — DIPHENHYDRAMINE HYDROCHLORIDE 50 MG/ML
50 INJECTION, SOLUTION INTRAMUSCULAR; INTRAVENOUS EVERY 6 HOURS PRN
Status: DISCONTINUED | OUTPATIENT
Start: 2025-05-22 | End: 2025-05-22

## 2025-05-22 RX ORDER — GAUZE BANDAGE 2" X 2"
100 BANDAGE TOPICAL DAILY
Status: DISCONTINUED | OUTPATIENT
Start: 2025-05-22 | End: 2025-05-28 | Stop reason: HOSPADM

## 2025-05-22 RX ORDER — LORAZEPAM 1 MG/1
2 TABLET ORAL EVERY 6 HOURS PRN
Status: DISCONTINUED | OUTPATIENT
Start: 2025-05-22 | End: 2025-05-22

## 2025-05-22 RX ORDER — HALOPERIDOL 5 MG/ML
5 INJECTION INTRAMUSCULAR EVERY 6 HOURS PRN
Status: DISCONTINUED | OUTPATIENT
Start: 2025-05-22 | End: 2025-05-22

## 2025-05-22 RX ORDER — ACETAMINOPHEN 325 MG/1
650 TABLET ORAL EVERY 6 HOURS PRN
Status: DISCONTINUED | OUTPATIENT
Start: 2025-05-22 | End: 2025-05-28 | Stop reason: HOSPADM

## 2025-05-22 RX ORDER — TRAZODONE HYDROCHLORIDE 100 MG/1
100 TABLET ORAL NIGHTLY PRN
Status: DISCONTINUED | OUTPATIENT
Start: 2025-05-22 | End: 2025-05-28 | Stop reason: HOSPADM

## 2025-05-22 RX ADMIN — TRAZODONE HYDROCHLORIDE 100 MG: 100 TABLET ORAL at 20:52

## 2025-05-22 RX ADMIN — Medication 1 TABLET: at 17:34

## 2025-05-22 RX ADMIN — THIAMINE HCL TAB 100 MG 100 MG: 100 TAB at 17:34

## 2025-05-22 ASSESSMENT — LIFESTYLE VARIABLES
HOW MANY STANDARD DRINKS CONTAINING ALCOHOL DO YOU HAVE ON A TYPICAL DAY: PATIENT UNABLE TO ANSWER
HOW OFTEN DO YOU HAVE A DRINK CONTAINING ALCOHOL: NEVER
HOW MANY STANDARD DRINKS CONTAINING ALCOHOL DO YOU HAVE ON A TYPICAL DAY: PATIENT DOES NOT DRINK
HOW OFTEN DO YOU HAVE A DRINK CONTAINING ALCOHOL: PATIENT UNABLE TO ANSWER
HOW MANY STANDARD DRINKS CONTAINING ALCOHOL DO YOU HAVE ON A TYPICAL DAY: PATIENT UNABLE TO ANSWER
HOW OFTEN DO YOU HAVE A DRINK CONTAINING ALCOHOL: PATIENT UNABLE TO ANSWER

## 2025-05-22 ASSESSMENT — PATIENT HEALTH QUESTIONNAIRE - PHQ9
SUM OF ALL RESPONSES TO PHQ QUESTIONS 1-9: 2
2. FEELING DOWN, DEPRESSED OR HOPELESS: SEVERAL DAYS
SUM OF ALL RESPONSES TO PHQ QUESTIONS 1-9: 2
1. LITTLE INTEREST OR PLEASURE IN DOING THINGS: SEVERAL DAYS

## 2025-05-22 ASSESSMENT — SLEEP AND FATIGUE QUESTIONNAIRES
AVERAGE NUMBER OF SLEEP HOURS: 6
DO YOU USE A SLEEP AID: NO
DO YOU HAVE DIFFICULTY SLEEPING: NO

## 2025-05-22 NOTE — H&P
Department of Psychiatry  Attending Physician Psychiatric Assessment   Patient: Daniel Pina Jr.  MRN: [id number]  Reason for Admission to Psychiatric Unit:  Threat to self requiring 24 hour professional observation  Threat to others requiring 24 hour professional observation  Acute disordered/bizarre behavior or psychomotor agitation or retardation;interferes with ADLs so that patient cannot function at a less intensive care level of care during evaluation and treatment   Cognitive impairment (disorientation or memory loss) due to a mental disorder excluding personality disorders or mental retardation (i.e. Axis I disorder);endangers welfare of patient or others   Patient has a dementing disorder and is admitted for eval or treatment of a psychiatric comorbidity (i.e. risk of suicide, violence, severe depression) warranting inpatient admission   A mental disorder causing major disability in social, interpersonal, occupational, and/or educational functioning that is leading to dangerous or life-threatening functioning, and that can only be addressed in an acute inpatient setting   A mental disorder that causes an inability to maintain adequate nurtrition or self-care, and family/community support cannot provide reliable, essential care, so that the patient cannont function at a less intensive level of care during evaluation and treatment   Failure of outpatient psychiatry treatment so that the beneficiary requires 24 hour professional observation and care  Concerns about patient's safety in the community  Past Mental Health Diagnosis: a history of  Major Depression, Schizoaffective Disorder, Prior suicide attempt, and Alcohol and/or Drug Use Disorder  Triggering event or precipitating factor:  Unknown triggering event patient recently placed at [facility] rehab on May 14, 2025  Length of time/duration of triggering event: Days  Legal Status: Involuntary    CHIEF COMPLAINT: Acute psychosis    History obtained from:

## 2025-05-22 NOTE — CONSULTS
[facility] Nurse  Consult Note       NAME:  Daniel Pina Jr.  MEDICAL RECORD NUMBER:  [id number]  AGE: 64 y.o.   GENDER: male  : 1960  TODAY'S DATE:  2025    Subjective:      Daniel Pina Jr. is a 64 y.o. male with inpatient referral to [facility] for:  Sacral wound      Wound Identification:  Wound Type: pressure  Contributing Factors: smoking and substance abuse    Wound History: Wound present for a few weeks, was present on admission at [facility] on   Current Wound Care Treatment:  foam    Patient Goal of Care:  [x] Wound Healing  [] Odor Control  [] Palliative Care  [] Pain Control   [] Other:         PAST MEDICAL HISTORY        Diagnosis Date    Depression     Fatty liver 2020    Hemorrhoids     Hx of blood clots     dvt and PE    Schizophrenia (HCC)        PAST SURGICAL HISTORY    Past Surgical History:   Procedure Laterality Date    COLONOSCOPY  14    normal exam, mild diverticulosis    OTHER SURGICAL HISTORY      jugular and carotid artery repair s/p suicide attempt       FAMILY HISTORY    Family History   Problem Relation Age of Onset    Cancer Mother     Cancer Brother         breast cancer       SOCIAL HISTORY    Social History     Tobacco Use    Smoking status: Every Day     Current packs/day: 0.00     Types: Cigars, Cigarettes    Smokeless tobacco: Never    Tobacco comments:     6 or 7 cigars   Vaping Use    Vaping status: Never Used   Substance Use Topics    Alcohol use: Yes     Comment: 8 times/week    Drug use: No         ALLERGIES    No Known Allergies    HOME MEDICATIONS  Prior to Admission medications    Medication Sig Start Date End Date Taking? Authorizing Provider   Nutritional Supplements (ENSURE PLUS) LIQD Take 1 Can by mouth daily 20  Sobeida Fernandez APRN - NP   sennosides-docusate sodium (SENOKOT-S) 8.6-50 MG tablet TAKE 2 TABLETS EVERY 12 HOURS 10/26/12   Mary Ellen Ahmadi MD   hydrocortisone 1 % cream Apply

## 2025-05-23 LAB
CHOLEST SERPL-MCNC: 125 MG/DL (ref 0–199)
CHOLESTEROL/HDL RATIO: 2.7
EST. AVERAGE GLUCOSE BLD GHB EST-MCNC: 114 MG/DL
HBA1C MFR BLD: 5.6 % (ref 4–6)
HDLC SERPL-MCNC: 47 MG/DL
LDLC SERPL CALC-MCNC: 67 MG/DL (ref 0–100)
TRIGL SERPL-MCNC: 55 MG/DL (ref 0–149)

## 2025-05-23 PROCEDURE — 97535 SELF CARE MNGMENT TRAINING: CPT

## 2025-05-23 PROCEDURE — 97162 PT EVAL MOD COMPLEX 30 MIN: CPT

## 2025-05-23 PROCEDURE — 80061 LIPID PANEL: CPT

## 2025-05-23 PROCEDURE — 6370000000 HC RX 637 (ALT 250 FOR IP): Performed by: INTERNAL MEDICINE

## 2025-05-23 PROCEDURE — GZHZZZZ GROUP PSYCHOTHERAPY: ICD-10-PCS | Performed by: PSYCHIATRY & NEUROLOGY

## 2025-05-23 PROCEDURE — 97530 THERAPEUTIC ACTIVITIES: CPT

## 2025-05-23 PROCEDURE — APPSS30 APP SPLIT SHARED TIME 16-30 MINUTES

## 2025-05-23 PROCEDURE — 97166 OT EVAL MOD COMPLEX 45 MIN: CPT

## 2025-05-23 PROCEDURE — 1240000000 HC EMOTIONAL WELLNESS R&B

## 2025-05-23 PROCEDURE — 6370000000 HC RX 637 (ALT 250 FOR IP)

## 2025-05-23 PROCEDURE — 99232 SBSQ HOSP IP/OBS MODERATE 35: CPT | Performed by: PSYCHIATRY & NEUROLOGY

## 2025-05-23 PROCEDURE — 83036 HEMOGLOBIN GLYCOSYLATED A1C: CPT

## 2025-05-23 PROCEDURE — 36415 COLL VENOUS BLD VENIPUNCTURE: CPT

## 2025-05-23 PROCEDURE — 99222 1ST HOSP IP/OBS MODERATE 55: CPT | Performed by: INTERNAL MEDICINE

## 2025-05-23 PROCEDURE — 6370000000 HC RX 637 (ALT 250 FOR IP): Performed by: PSYCHIATRY & NEUROLOGY

## 2025-05-23 RX ORDER — SENNA AND DOCUSATE SODIUM 50; 8.6 MG/1; MG/1
2 TABLET, FILM COATED ORAL DAILY PRN
Status: DISCONTINUED | OUTPATIENT
Start: 2025-05-23 | End: 2025-05-28 | Stop reason: HOSPADM

## 2025-05-23 RX ORDER — POTASSIUM CHLORIDE 1500 MG/1
40 TABLET, EXTENDED RELEASE ORAL ONCE
Status: COMPLETED | OUTPATIENT
Start: 2025-05-23 | End: 2025-05-23

## 2025-05-23 RX ORDER — RISPERIDONE 0.5 MG/1
1 TABLET, ORALLY DISINTEGRATING ORAL 2 TIMES DAILY
Status: DISCONTINUED | OUTPATIENT
Start: 2025-05-23 | End: 2025-05-28 | Stop reason: HOSPADM

## 2025-05-23 RX ADMIN — POTASSIUM CHLORIDE 40 MEQ: 1500 TABLET, EXTENDED RELEASE ORAL at 15:13

## 2025-05-23 RX ADMIN — RISPERIDONE 1 MG: 0.5 TABLET, ORALLY DISINTEGRATING ORAL at 22:22

## 2025-05-23 RX ADMIN — Medication 1 TABLET: at 09:08

## 2025-05-23 RX ADMIN — TRAZODONE HYDROCHLORIDE 100 MG: 100 TABLET ORAL at 21:37

## 2025-05-23 RX ADMIN — THIAMINE HCL TAB 100 MG 100 MG: 100 TAB at 09:08

## 2025-05-23 NOTE — H&P
[facility] Internal Medicine  Mateo Sanchez MD; Cory France MD,, Tanesha Mckinney MD, Dr Zulema Schmitt MD   ; Samm Rahman MD    [facility] Internal Medicine   IN-PATIENT SERVICE   [facility]     HISTORY AND PHYSICAL EXAMINATION            Date:   5/23/2025  Patient name:  Daniel Pina Jr.  Date of admission:  5/22/2025  1:17 PM  MRN:   [id number]  Account:  [id number]  YOB: 1960  PCP:    No primary care provider on file.  Room:   [address] [place] [address]  Code Status:    Full Code      Chief Complaint:     Suicidal /Ac Psychosis    History Obtained From:     Patient/EMR/bedside RN     History of Present Illness:     64-year-old -American gentleman with history of mental health disorder depression history of fatty liver admitted for mental health condition noted to have anemia hemoglobin 7.2 normocytic with MCV is more than 90 with history of normal B12 high ferritin levels patient has history of alcohol abuse and elevated LFTs in the past  Patient had an office visit with GI in August 2020 but did not follow-up  Patient denies any GI bleed patient did have history of self-inflicted wounds  Hypokalemia potassium 3.1 denies any nausea vomiting diarrhea no cathartic or diuretic abuse no paresthesia    Past Medical History:     Past Medical History:   Diagnosis Date    Depression     Fatty liver 8/26/2020    Hemorrhoids     Hx of blood clots     dvt and PE    Schizophrenia (HCC)         Past Surgical History:     Past Surgical History:   Procedure Laterality Date    COLONOSCOPY  5-28-14    normal exam, mild diverticulosis    OTHER SURGICAL HISTORY      jugular and carotid artery repair s/p suicide attempt        Medications Prior to Admission:     Prior to Admission medications    Medication Sig Start Date End Date Taking? Authorizing Provider   Nutritional Supplements (ENSURE PLUS) LIQD Take 1 Can by mouth daily 8/25/20 9/24/20  Sobeida Fernandez, APRN - NP  OT ACUTE Treatment Session    Pt seen on 3EF nursing unit.                                                          Frequency Comments: M-F (Catrachiton to see)     Admitting complaint:: Persistent vomiting [R11.10]  Secondary hypertension [I15.9]                                                                                         Precautions  Other Precautions: goes by 'Abe' (01/27/18 0914)  Precautions Comments: fall risk due to impulsiveness and weakness (03/07/18 1046)      ASSESSMENT: Current overall ADL status is supv/set-up for toileting and standing grooming at sink for safety due to weakness. Current functional mobility for ADL and Instrumental-ADL tasks is grossly supv w/ 2WW (occasional periods of light min A for touching/steadying due to impulsivity) for ambulation in hallway (approx 200 feet) for safety due to weakness, fatigue, and decreased safety awareness at times. Limitations at this time include weakness, fatigue, cognitive deficits, balance and medical status. Patient will benefit from further skilled OT for continued training with functional mobility and ADLs to help the patient meet goal of increasing independence and maximizing safety.      RECOMMENDATIONS FOR DISCHARGE:  Recommendations for Discharge: OT: 24 Hour assist;Home (03/08/18 1310)    OT Identified Barriers to Discharge: Medical status, weakness, cognition, fall risk     PT/OT Mobility Equipment for Discharge: 2ww to be delivered on day of discharge  (03/08/18 1310)  PT/OT ADL Equipment for Discharge: wife reports they have all equipment they need  (03/08/18 1310)    ICU Mobility Assesment (PERME):         PLAN: Continue skilled OT, including the following Treatment Interventions: ADL retraining;Functional transfer training;UE strengthening/ROM;Endurance training;Patient/Family training;Equipment eval/education;Compensatory technique education (03/08/18 1310)   Treatment Plan for Next Session: Lower body dressing  Additional Plan  Considerations: pre-medicate, bring rehab aide for line management if getting into bathroom        EDUCATION:   On this date, the patient was educated on role of OT, plan of care, importance of daily activity.    The response to education was: Needs reinforcement                                                    SUBJECTIVE: Patient's Personal Goal: To return home (03/08/18 1310)   Subjective: Pt agreeable to session. \"I'm doing okay.\" (03/08/18 1310)  Subjective/Objective Comments: JEREMIAH ashley'd session. Pt seated in recliner with needs met, call light in reach, chair alarm activated at end of session. (03/08/18 1310)    OBJECTIVE:Basic Lines: PICC;Telemetry (03/08/18 1310)  Complex Lines: Wound vac (03/08/18 1310)  Safety Measures: Alarms (03/08/18 1310)    RN reported Blood Fall Scale Score: 75       Last 24 hours of Functional Data     ADLs  Self Cares/ADL's  Grooming Assistance: Supervision;Set-up;Standing at sink (03/08/18 1310)  Upper Body Dressing Assistance: Minimal Assist (Min) (03/08/18 1310)  Upper Body Dressing Deficit: Thread RUE;Thread LUE;Pull around back;Pull down in back;Fasteners (03/08/18 1310)  Toileting Assistance: Supervision (03/08/18 1310)  Toileting Deficit: Clothing management up;Clothing management down;Perineal hygiene (03/08/18 1310)  Self Cares/ADL's Comments #1: Supv for toileting and standing grooming at sink for safety due to weakness, impulsivity. Min A for donning robe w/ assist needed to thread UEs, pull around back, and manage fasteners around lines. (03/08/18 1310)    Household mobility  Household Mobility  Sit to Stand: Supervision (03/08/18 1310)  Stand to Sit: Supervision (03/08/18 1310)  Toilet Transfers: Supervision (03/08/18 1310)  Transfer Equipment: gait belt, 2WW (03/08/18 1310)  Sitting - Static: Modified Independent (03/08/18 1310)  Sitting - Dynamic: Modified Independent (03/08/18 1310)  Standing - Static: Supervision (03/08/18 1310)  Standing - Dynamic: Minimal  Assist (Min);Touching/Steadying Assistance;Supervision (03/08/18 1310)  Household Mobility Comments #1: Grossly supv w/ 2WW (occasional periods of light min A for touching/steadying due to impulsivity) for ambulation in hallway (approx 200 feet) for safety due to weakness, fatigue, and decreased safety awareness at times. (03/08/18 1310)    Home Management       Tolerance  OT Activity Tolerance  Activity Tolerance: 1:1 Activity to rest (03/08/18 1310)  Activity Tolerance Comments: Fair  (03/08/18 1310)    Cognition  Communication/Cognition  Following Verbal Directions: Follows one step directions without difficulty (03/08/18 1310)    Patient's Personal Goal: To return home (03/08/18 1310)    Therapy Goals:    Goals  Short Term Goals to Be Reviewed On: 03/12/18 (03/08/18 1310)  Short Term Goals Are The Same as Discharge Goals: Yes (03/08/18 1310)  Goal Agreement: Patient agrees with goals and treatment plan (03/08/18 1310)  Grooming Short Term Goal 1: goal met short term goal (03/08/18 1310)  Grooming Discharge Goal 1: Pt to complete 2 grooming tasks with modified independence (03/08/18 1310)  Grooming Discharge Goal Progress 1: Outcome met, continue evaluating goal progress toward completion (03/05/18 1040)  Bathing Short Term Goal 1: short term goal met (03/08/18 1310)  Bathing Discharge Goal 1: Pt to complete full body sponge bathing with modified independence (03/08/18 1310)  Bathing Discharge Goal Progress 1: Outcome met, continue evaluating goal progress toward completion (03/05/18 1040)  Bathing Short Term Goal 2: short term goal met (03/08/18 1310)  Bathing Discharge Goal 2: LB bathing seated with modified independent (03/08/18 1310)  Bathing Discharge Goal Progress 2: Outcome met, continue evaluating goal progress toward completion (03/05/18 1040)  Dressing Short Term Goal 1: short term goal met (03/08/18 1310)  Dressing Discharge Goal 1: Pt to complete full body dressing with modified independence (03/08/18  1310)  Dressing Discharge Goal Progress 1: Outcome met, continue evaluating goal progress toward completion (03/05/18 1040)  Dressing Discharge Goal 2: LB dressing with modified independence (03/08/18 1310)  Dressing Discharge Goal Progress 2: Outcome not met, plan adjusted (03/05/18 1040)  Toileting Short Term Goal 1: short term goal met, see long term goal (03/08/18 1310)  Toileting Discharge Goal 1: Pt to toilet with modified independence (03/08/18 1310)  Toileting Discharge Goal Progress 1: Outcome met, continue evaluating goal progress toward completion (03/05/18 1040)  Home Setting Transfer Short Term Goal 1: GOAL MET - Max A for stand pivot transfer using walker to recliner  (03/08/18 1310)  Home Setting Transfer Discharge Goal 1: Pt to complete all necessary functional transfers with modified independence (03/08/18 1310)  Home Setting Transfer Discharge Goal Progress 1: Outcome met, continue evaluating goal progress toward completion (03/05/18 1040)  UE Function Discharge Goal 1: Pt to complete BUE AROM ther ex HEP with handouts and Min. A  (03/08/18 1310)  UE Function Discharge Goal Progress 1: Outcome met, continue evaluating goal progress toward completion (03/05/18 1040)        Total Treatment Time:  OT Time Spent: 40 minutes (03/08/18 1310)    See OT flowsheet for full details regarding the OT therapy provided.

## 2025-05-23 NOTE — GROUP NOTE
Group Therapy Note    Date: 5/23/2025    Group Start Time: 1430  Group End Time: 1500  Group Topic: Nursing    [facility] Lenka Lopez RN        Group Therapy Note    Attendees: 6/10         Status After Intervention:  Unchanged    Participation Level: Active Listener    Participation Quality: Appropriate      Speech:  normal      Thought Process/Content: Logical      Affective Functioning: Congruent      Mood: euthymic      Level of consciousness:  Alert, Oriented x4, and Attentive      Response to Learning: Able to change behavior      Endings: None Reported    Modes of Intervention: Support and Socialization      Discipline Responsible: Registered Nurse      Signature:  Lenka Justice RN

## 2025-05-23 NOTE — GROUP NOTE
Group Therapy Note    Date: 5/23/2025    Group Start Time: 1000  Group End Time: 1035  Group Topic: Psychotherapy     Kelli Segovia MSW        Group Therapy Note    Attendees: 6/10     Patient was offered group therapy today but declined to participate despite encouragement from staff.  1:1 was offered.    Discipline Responsible: /Counselor      Signature:  RAFA Garcia

## 2025-05-23 NOTE — GROUP NOTE
Group Therapy Note    Date: 5/23/2025    Group Start Time: 1330  Group End Time: 1410  Group Topic: Cognitive Skills    [facility]    Milly Santos CTRS        Group Therapy Note    Attendees: 8/10       Patient's Goal:  pt will demonstrate improved coping skills and improved interpersonal relationships    Notes:   pt was pleasant but had minimal participation     Status After Intervention:  improved    Participation Level: minimal    Participation Quality: minimal      Speech:  normal      Thought Process/Content:unable to assess      Affective Functioning: flat      Mood: depressed      Level of consciousness:  Alert      Response to Learning: Progressing to goal      Endings: None Reported    Modes of Intervention: Education, Support, Socialization, and Activity      Discipline Responsible: Psychoeducational Specialist      Signature:  DWAINE HAZEL

## 2025-05-24 LAB
BASOPHILS # BLD: 0.05 K/UL (ref 0–0.2)
BASOPHILS NFR BLD: 1 % (ref 0–2)
EKG ATRIAL RATE: 79 BPM
EKG P AXIS: 79 DEGREES
EKG P-R INTERVAL: 182 MS
EKG Q-T INTERVAL: 418 MS
EKG QRS DURATION: 132 MS
EKG QTC CALCULATION (BAZETT): 479 MS
EKG R AXIS: 86 DEGREES
EKG T AXIS: 70 DEGREES
EKG VENTRICULAR RATE: 79 BPM
EOSINOPHIL # BLD: 0.07 K/UL (ref 0–0.44)
EOSINOPHILS RELATIVE PERCENT: 1 % (ref 0–4)
ERYTHROCYTE [DISTWIDTH] IN BLOOD BY AUTOMATED COUNT: 16.2 % (ref 11.5–14.9)
FOLATE SERPL-MCNC: 11.6 NG/ML (ref 4.8–24.2)
HCT VFR BLD AUTO: 27.1 % (ref 41–53)
HGB BLD-MCNC: 8.9 G/DL (ref 13.5–17.5)
IMM GRANULOCYTES # BLD AUTO: <0.03 K/UL (ref 0–0.3)
IMM GRANULOCYTES NFR BLD: 0 %
IMM RETICS NFR: 16.4 % (ref 2.7–18.3)
IRON SATN MFR SERPL: 36 % (ref 20–55)
IRON SERPL-MCNC: 69 UG/DL (ref 61–157)
LYMPHOCYTES NFR BLD: 1.22 K/UL (ref 1.1–3.7)
LYMPHOCYTES RELATIVE PERCENT: 21 % (ref 24–44)
MCH RBC QN AUTO: 31 PG (ref 26–34)
MCHC RBC AUTO-ENTMCNC: 32.8 G/DL (ref 31–37)
MCV RBC AUTO: 94.4 FL (ref 80–100)
MONOCYTES NFR BLD: 0.79 K/UL (ref 0.1–1.2)
MONOCYTES NFR BLD: 14 % (ref 3–12)
NEUTROPHILS NFR BLD: 63 % (ref 36–66)
NEUTS SEG NFR BLD: 3.63 K/UL (ref 1.5–8.1)
NRBC BLD-RTO: 0 PER 100 WBC
PLATELET # BLD AUTO: 320 K/UL (ref 150–450)
PMV BLD AUTO: 10.7 FL (ref 8–13.5)
RBC # BLD AUTO: 2.87 M/UL (ref 4.21–5.77)
RETIC HEMOGLOBIN: 33.2 PG (ref 28.2–35.7)
RETICS # AUTO: 0.08 M/UL (ref 0.03–0.08)
RETICS/RBC NFR AUTO: 2.7 % (ref 0.5–2.5)
TIBC SERPL-MCNC: 190 UG/DL (ref 250–450)
UNSATURATED IRON BINDING CAPACITY: 121 UG/DL (ref 112–347)
VIT B12 SERPL-MCNC: 575 PG/ML (ref 232–1245)
WBC OTHER # BLD: 5.8 K/UL (ref 3.5–11)

## 2025-05-24 PROCEDURE — 82607 VITAMIN B-12: CPT

## 2025-05-24 PROCEDURE — 99232 SBSQ HOSP IP/OBS MODERATE 35: CPT | Performed by: INTERNAL MEDICINE

## 2025-05-24 PROCEDURE — 99232 SBSQ HOSP IP/OBS MODERATE 35: CPT

## 2025-05-24 PROCEDURE — 83550 IRON BINDING TEST: CPT

## 2025-05-24 PROCEDURE — 82746 ASSAY OF FOLIC ACID SERUM: CPT

## 2025-05-24 PROCEDURE — 6370000000 HC RX 637 (ALT 250 FOR IP)

## 2025-05-24 PROCEDURE — 83540 ASSAY OF IRON: CPT

## 2025-05-24 PROCEDURE — 1240000000 HC EMOTIONAL WELLNESS R&B

## 2025-05-24 PROCEDURE — 36415 COLL VENOUS BLD VENIPUNCTURE: CPT

## 2025-05-24 PROCEDURE — 85025 COMPLETE CBC W/AUTO DIFF WBC: CPT

## 2025-05-24 PROCEDURE — 83516 IMMUNOASSAY NONANTIBODY: CPT

## 2025-05-24 PROCEDURE — 82784 ASSAY IGA/IGD/IGG/IGM EACH: CPT

## 2025-05-24 PROCEDURE — 6370000000 HC RX 637 (ALT 250 FOR IP): Performed by: PSYCHIATRY & NEUROLOGY

## 2025-05-24 PROCEDURE — 85045 AUTOMATED RETICULOCYTE COUNT: CPT

## 2025-05-24 RX ADMIN — Medication 1 TABLET: at 09:43

## 2025-05-24 RX ADMIN — RISPERIDONE 1 MG: 0.5 TABLET, ORALLY DISINTEGRATING ORAL at 09:43

## 2025-05-24 RX ADMIN — TRAZODONE HYDROCHLORIDE 100 MG: 100 TABLET ORAL at 20:34

## 2025-05-24 RX ADMIN — THIAMINE HCL TAB 100 MG 100 MG: 100 TAB at 09:43

## 2025-05-24 RX ADMIN — RISPERIDONE 1 MG: 0.5 TABLET, ORALLY DISINTEGRATING ORAL at 20:34

## 2025-05-24 ASSESSMENT — LIFESTYLE VARIABLES
HOW MANY STANDARD DRINKS CONTAINING ALCOHOL DO YOU HAVE ON A TYPICAL DAY: PATIENT UNABLE TO ANSWER
HOW OFTEN DO YOU HAVE A DRINK CONTAINING ALCOHOL: PATIENT UNABLE TO ANSWER

## 2025-05-24 NOTE — GROUP NOTE
Nursing Group Note        Date: May 24, 2025 Start Time: 1500  End Time: 1520      Number of Participants in Group & Unit Census:  1/7    Topic: Survive Island     Goal of Group:To use critical and problem solve skills to survive on an island.      Comments:     Patient did not participate in Nursing group, despite staff encouragement and explanation of benefits.  Patient remain seclusive to self.  Q15 minute safety checks maintained for patient safety and will continue to encourage patient to attend unit programming.

## 2025-05-24 NOTE — GROUP NOTE
Nursing  Group Note        Date: May 24, 2025 Start Time:  1020   End Time:  1029      Number of Participants in Group & Unit Census:  2/9    Topic: Goals Group    Goal of Group:Identify a goal for the day.       Comments:     Patient did not participate in  Nursing  group, despite staff encouragement and explanation of benefits.  Patient remain seclusive to self.  Q15 minute safety checks maintained for patient safety and will continue to encourage patient to attend unit programming.

## 2025-05-24 NOTE — GROUP NOTE
Group Therapy Note    Date: 5/24/2025    Group Start Time: 1030  Group End Time: 1050  Group Topic: Psychotherapy    Kelli Reynaga MSW        Group Therapy Note    Attendees: 2/9     Patient was offered group therapy today but declined to participate despite encouragement from staff.  1:1 was offered.    Discipline Responsible: /Counselor      Signature:  RAFA Garcia

## 2025-05-25 PROBLEM — D64.9 ANEMIA: Status: ACTIVE | Noted: 2025-05-25

## 2025-05-25 PROBLEM — Z87.898 HISTORY OF ALCOHOL USE: Status: ACTIVE | Noted: 2025-05-25

## 2025-05-25 LAB
BASOPHILS # BLD: 0 K/UL (ref 0–0.2)
BASOPHILS NFR BLD: 0 % (ref 0–2)
EOSINOPHIL # BLD: 0.06 K/UL (ref 0–0.4)
EOSINOPHILS RELATIVE PERCENT: 1 % (ref 0–4)
ERYTHROCYTE [DISTWIDTH] IN BLOOD BY AUTOMATED COUNT: 16 % (ref 11.5–14.9)
HCT VFR BLD AUTO: 26.5 % (ref 41–53)
HGB BLD-MCNC: 8.7 G/DL (ref 13.5–17.5)
IMM GRANULOCYTES # BLD AUTO: 0 K/UL (ref 0–0.3)
IMM GRANULOCYTES NFR BLD: 0 %
LYMPHOCYTES NFR BLD: 1.38 K/UL (ref 1–4.8)
LYMPHOCYTES RELATIVE PERCENT: 23 % (ref 24–44)
MCH RBC QN AUTO: 31.1 PG (ref 26–34)
MCHC RBC AUTO-ENTMCNC: 32.8 G/DL (ref 31–37)
MCV RBC AUTO: 94.6 FL (ref 80–100)
MONOCYTES NFR BLD: 0.66 K/UL (ref 0.1–1.3)
MONOCYTES NFR BLD: 11 % (ref 1–7)
MORPHOLOGY: ABNORMAL
NEUTROPHILS NFR BLD: 65 % (ref 36–66)
NEUTS SEG NFR BLD: 3.9 K/UL (ref 1.3–9.1)
NRBC BLD-RTO: 0 PER 100 WBC
PLATELET # BLD AUTO: 313 K/UL (ref 150–450)
PMV BLD AUTO: 10.1 FL (ref 8–13.5)
RBC # BLD AUTO: 2.8 M/UL (ref 4.21–5.77)
WBC OTHER # BLD: 6 K/UL (ref 3.5–11)

## 2025-05-25 PROCEDURE — 99232 SBSQ HOSP IP/OBS MODERATE 35: CPT

## 2025-05-25 PROCEDURE — 6370000000 HC RX 637 (ALT 250 FOR IP)

## 2025-05-25 PROCEDURE — 85025 COMPLETE CBC W/AUTO DIFF WBC: CPT

## 2025-05-25 PROCEDURE — 99255 IP/OBS CONSLTJ NEW/EST HI 80: CPT | Performed by: INTERNAL MEDICINE

## 2025-05-25 PROCEDURE — 99232 SBSQ HOSP IP/OBS MODERATE 35: CPT | Performed by: INTERNAL MEDICINE

## 2025-05-25 PROCEDURE — 6370000000 HC RX 637 (ALT 250 FOR IP): Performed by: PSYCHIATRY & NEUROLOGY

## 2025-05-25 PROCEDURE — 36415 COLL VENOUS BLD VENIPUNCTURE: CPT

## 2025-05-25 PROCEDURE — 1240000000 HC EMOTIONAL WELLNESS R&B

## 2025-05-25 RX ADMIN — RISPERIDONE 1 MG: 0.5 TABLET, ORALLY DISINTEGRATING ORAL at 08:42

## 2025-05-25 RX ADMIN — THIAMINE HCL TAB 100 MG 100 MG: 100 TAB at 08:42

## 2025-05-25 RX ADMIN — RISPERIDONE 1 MG: 0.5 TABLET, ORALLY DISINTEGRATING ORAL at 20:57

## 2025-05-25 RX ADMIN — TRAZODONE HYDROCHLORIDE 100 MG: 100 TABLET ORAL at 20:57

## 2025-05-25 RX ADMIN — Medication 1 TABLET: at 08:42

## 2025-05-25 ASSESSMENT — PAIN SCALES - GENERAL: PAINLEVEL_OUTOF10: 0

## 2025-05-25 NOTE — CONSULTS
Today's Date: 5/25/2025  Patient Name: Daniel Pina Jr.  Date of admission: 5/22/2025  1:17 PM  Patient's age: 64 y.o., 1960  Admission Dx: Acute psychosis (HCC) [F23]    Reason for Consult: management recommendations  Requesting Physician: Ivan Perdue MD    CHIEF COMPLAINT:  Anemia    History Obtained From:  patient, electronic medical record    HISTORY OF PRESENT ILLNESS:      The patient is a 64 y.o.  male who is admitted to [facility] for mental health condition.  Found to have anemia with hemoglobin 7.2.  Patient has normal B12 high ferritin level.  Patient has history of alcohol abuse and elevated LFTs in the past.  Hematology oncology team consulted for anemia      Most recent hemoglobin 8.7.  Patient on 313.  MCV 94 patient has adequate B12 folic acid stores.  Reticulocyte count slightly elevated however RPI low for the degree of anemia.  Kidney function is adequate    Ultrasound abdomen from 2019 showed hepatic steatosis      Past Medical History:   has a past medical history of Depression, Fatty liver, Hemorrhoids, Hx of blood clots, and Schizophrenia (HCC).    Past Surgical History:   has a past surgical history that includes other surgical history and Colonoscopy (5-28-14).     Medications:    Prior to Admission medications    Medication Sig Start Date End Date Taking? Authorizing Provider   Nutritional Supplements (ENSURE PLUS) LIQD Take 1 Can by mouth daily 8/25/20 9/24/20  Sobeida Fernandez APRN - NP   sennosides-docusate sodium (SENOKOT-S) 8.6-50 MG tablet TAKE 2 TABLETS EVERY 12 HOURS 10/26/12   Mary Ellen Ahmadi MD   hydrocortisone 1 % cream Apply  topically 2 times daily. Apply topically 2 times daily.     Provider, MD Crystal   ABILIFY 30 MG tablet Take 1 tablet by mouth daily. 8/17/12   Crystal Gandhi MD   benztropine (COGENTIN) 0.5 MG tablet Take 1 tablet by mouth 2 times daily. 8/17/12   Crystal Gandhi MD   CYMBALTA 60 MG capsule Take 1 capsule by

## 2025-05-25 NOTE — GROUP NOTE
Group Therapy Note    Date: 5/25/2025    Group Start Time: 1640  Group End Time: 1650  Group Topic: Nursing    Enid Contreras, JUAN C; John Ocasio RN        Group Therapy Note    Attendees: 7/7    Patient participated in Sunday Safety check. No contraband found. Gloves in room, however staff is with patient 24/7 due to a sitter being ordered.

## 2025-05-25 NOTE — GROUP NOTE
Group Therapy Note    Date: 5/25/2025    Group Start Time: 1330  Group End Time: 1401  Group Topic: Nursing    Enid Contreras RN        Group Therapy Note    Attendees: 7/8       Patient's Goal:  To engage in social activity's and discuss current feeling.     Status After Intervention:  Improved    Participation Level: Minimal    Participation Quality: Attentive      Speech:  hesitant      Thought Process/Content: Linear      Affective Functioning: Congruent      Mood:  stable      Level of consciousness:  Preoccupied      Response to Learning: Progressing to goal      Endings: None Reported    Modes of Intervention: Support, Socialization, and Activity      Discipline Responsible: Registered Nurse      Signature:  Enid Herrera RN

## 2025-05-25 NOTE — GROUP NOTE
Group Therapy Note    Date: 5/25/2025    Group Start Time: 1030  Group End Time: 1058  Group Topic: Psychoeducation    BELINDA BHSary Contreras MSW        Group Therapy Note    Attendees: 2/8       Patient was offered group therapy today but declined to participate despite encouragement from staff.  1:1 was offered.       Signature:  RAFA Weber

## 2025-05-26 LAB
ATYPICAL LYMPHOCYTE ABSOLUTE COUNT: 0.06 K/UL
ATYPICAL LYMPHOCYTES: 1 %
BASOPHILS # BLD: 0 K/UL (ref 0–0.2)
BASOPHILS NFR BLD: 0 % (ref 0–2)
DATE, STOOL #1: NORMAL
EOSINOPHIL # BLD: 0 K/UL (ref 0–0.4)
EOSINOPHILS RELATIVE PERCENT: 0 % (ref 0–4)
ERYTHROCYTE [DISTWIDTH] IN BLOOD BY AUTOMATED COUNT: 15.6 % (ref 11.5–14.9)
FERRITIN SERPL-MCNC: 500 NG/ML
GLIADIN IGA SER IA-ACNC: 4.4 U/ML
GLIADIN IGG SER IA-ACNC: 0.7 U/ML
HAPTOGLOB SERPL-MCNC: 230 MG/DL (ref 30–200)
HCT VFR BLD AUTO: 29.5 % (ref 41–53)
HEMOCCULT SP1 STL QL: NEGATIVE
HGB BLD-MCNC: 9.7 G/DL (ref 13.5–17.5)
IGA SERPL-MCNC: 706 MG/DL (ref 70–400)
IMM GRANULOCYTES # BLD AUTO: 0 K/UL (ref 0–0.3)
IMM GRANULOCYTES NFR BLD: 0 %
LDH SERPL-CCNC: 186 U/L (ref 135–225)
LYMPHOCYTES NFR BLD: 1.45 K/UL (ref 1–4.8)
LYMPHOCYTES RELATIVE PERCENT: 23 % (ref 24–44)
MCH RBC QN AUTO: 30.6 PG (ref 26–34)
MCHC RBC AUTO-ENTMCNC: 32.9 G/DL (ref 31–37)
MCV RBC AUTO: 93.1 FL (ref 80–100)
MONOCYTES NFR BLD: 0.69 K/UL (ref 0.1–1.3)
MONOCYTES NFR BLD: 11 % (ref 1–7)
MORPHOLOGY: ABNORMAL
NEUTROPHILS NFR BLD: 65 % (ref 36–66)
NEUTS SEG NFR BLD: 4.1 K/UL (ref 1.3–9.1)
NRBC BLD-RTO: 0 PER 100 WBC
PLATELET # BLD AUTO: 390 K/UL (ref 150–450)
PMV BLD AUTO: 10.2 FL (ref 8–13.5)
RBC # BLD AUTO: 3.17 M/UL (ref 4.21–5.77)
TIME, STOOL #1: NORMAL
TROPONIN I SERPL HS-MCNC: 33 NG/L (ref 0–22)
TTG IGA SER IA-ACNC: 1.8 U/ML
WBC OTHER # BLD: 6.3 K/UL (ref 3.5–11)

## 2025-05-26 PROCEDURE — 6370000000 HC RX 637 (ALT 250 FOR IP)

## 2025-05-26 PROCEDURE — 6370000000 HC RX 637 (ALT 250 FOR IP): Performed by: PSYCHIATRY & NEUROLOGY

## 2025-05-26 PROCEDURE — 86334 IMMUNOFIX E-PHORESIS SERUM: CPT

## 2025-05-26 PROCEDURE — 93005 ELECTROCARDIOGRAM TRACING: CPT | Performed by: INTERNAL MEDICINE

## 2025-05-26 PROCEDURE — 83010 ASSAY OF HAPTOGLOBIN QUANT: CPT

## 2025-05-26 PROCEDURE — 99232 SBSQ HOSP IP/OBS MODERATE 35: CPT | Performed by: INTERNAL MEDICINE

## 2025-05-26 PROCEDURE — 99232 SBSQ HOSP IP/OBS MODERATE 35: CPT | Performed by: PSYCHIATRY & NEUROLOGY

## 2025-05-26 PROCEDURE — APPSS30 APP SPLIT SHARED TIME 16-30 MINUTES

## 2025-05-26 PROCEDURE — 84155 ASSAY OF PROTEIN SERUM: CPT

## 2025-05-26 PROCEDURE — 36415 COLL VENOUS BLD VENIPUNCTURE: CPT

## 2025-05-26 PROCEDURE — 83521 IG LIGHT CHAINS FREE EACH: CPT

## 2025-05-26 PROCEDURE — 1240000000 HC EMOTIONAL WELLNESS R&B

## 2025-05-26 PROCEDURE — 84165 PROTEIN E-PHORESIS SERUM: CPT

## 2025-05-26 PROCEDURE — 83615 LACTATE (LD) (LDH) ENZYME: CPT

## 2025-05-26 PROCEDURE — 82270 OCCULT BLOOD FECES: CPT

## 2025-05-26 PROCEDURE — 82728 ASSAY OF FERRITIN: CPT

## 2025-05-26 PROCEDURE — 85025 COMPLETE CBC W/AUTO DIFF WBC: CPT

## 2025-05-26 PROCEDURE — 84484 ASSAY OF TROPONIN QUANT: CPT

## 2025-05-26 RX ADMIN — TRAZODONE HYDROCHLORIDE 100 MG: 100 TABLET ORAL at 20:31

## 2025-05-26 RX ADMIN — RISPERIDONE 1 MG: 0.5 TABLET, ORALLY DISINTEGRATING ORAL at 20:31

## 2025-05-26 RX ADMIN — ACETAMINOPHEN 650 MG: 325 TABLET ORAL at 08:38

## 2025-05-26 RX ADMIN — Medication 1 TABLET: at 08:39

## 2025-05-26 RX ADMIN — RISPERIDONE 1 MG: 0.5 TABLET, ORALLY DISINTEGRATING ORAL at 08:39

## 2025-05-26 RX ADMIN — THIAMINE HCL TAB 100 MG 100 MG: 100 TAB at 08:39

## 2025-05-26 ASSESSMENT — PAIN SCALES - GENERAL
PAINLEVEL_OUTOF10: 3
PAINLEVEL_OUTOF10: 3

## 2025-05-26 ASSESSMENT — PAIN DESCRIPTION - ORIENTATION: ORIENTATION: OTHER (COMMENT)

## 2025-05-26 ASSESSMENT — PAIN DESCRIPTION - LOCATION: LOCATION: ARM

## 2025-05-26 ASSESSMENT — PAIN DESCRIPTION - DESCRIPTORS: DESCRIPTORS: ACHING

## 2025-05-26 NOTE — GROUP NOTE
Group Therapy Note    Date: 5/26/2025    Group Start Time: 1015  Group End Time: 1115  Group Topic: Cognitive Skills    Milly Issa CTRS      Cognitive Skills Group Note        Date: May 26. 2025 Start Time:  1015 am    End Time:  1115 am       Number of Participants in Group & Unit Census:   5/7    Topic: cognitive skills    Goal of Group pt will demonstrate improved coping skills and improved interpersonal relationships      Comments:     Patient did not participate in Cognitive Skills group, despite staff encouragement and explanation of benefits.  Patient remain seclusive to self.  Q15 minute safety checks maintained for patient safety and will continue to encourage patient to attend unit programming.              Signature:  DWAINE HAZEL

## 2025-05-26 NOTE — GROUP NOTE
Nursing Group Note        Date: May 26, 2025 Start Time: 0915  End Time: 0930      Number of Participants in Group & Unit Census:  4/7    Topic: Goals Group    Goal of Group:Identify a goal for today.      Comments:     Patient did not participate in Nursing group, despite staff encouragement and explanation of benefits.  Patient remain seclusive to self.  Q15 minute safety checks maintained for patient safety and will continue to encourage patient to attend unit programming.

## 2025-05-27 LAB
EKG ATRIAL RATE: 90 BPM
EKG P AXIS: 76 DEGREES
EKG P-R INTERVAL: 170 MS
EKG Q-T INTERVAL: 392 MS
EKG QRS DURATION: 130 MS
EKG QTC CALCULATION (BAZETT): 479 MS
EKG R AXIS: 86 DEGREES
EKG T AXIS: 66 DEGREES
EKG VENTRICULAR RATE: 90 BPM
SURGICAL PATHOLOGY REPORT: NORMAL
TROPONIN I SERPL HS-MCNC: 39 NG/L (ref 0–22)
TROPONIN I SERPL HS-MCNC: 43 NG/L (ref 0–22)

## 2025-05-27 PROCEDURE — 6370000000 HC RX 637 (ALT 250 FOR IP)

## 2025-05-27 PROCEDURE — 6370000000 HC RX 637 (ALT 250 FOR IP): Performed by: PSYCHIATRY & NEUROLOGY

## 2025-05-27 PROCEDURE — 93010 ELECTROCARDIOGRAM REPORT: CPT | Performed by: INTERNAL MEDICINE

## 2025-05-27 PROCEDURE — APPSS30 APP SPLIT SHARED TIME 16-30 MINUTES

## 2025-05-27 PROCEDURE — 1240000000 HC EMOTIONAL WELLNESS R&B

## 2025-05-27 PROCEDURE — 90833 PSYTX W PT W E/M 30 MIN: CPT | Performed by: PSYCHIATRY & NEUROLOGY

## 2025-05-27 PROCEDURE — 99232 SBSQ HOSP IP/OBS MODERATE 35: CPT | Performed by: INTERNAL MEDICINE

## 2025-05-27 PROCEDURE — 36415 COLL VENOUS BLD VENIPUNCTURE: CPT

## 2025-05-27 PROCEDURE — 99232 SBSQ HOSP IP/OBS MODERATE 35: CPT | Performed by: PSYCHIATRY & NEUROLOGY

## 2025-05-27 PROCEDURE — 84484 ASSAY OF TROPONIN QUANT: CPT

## 2025-05-27 RX ADMIN — Medication 1 TABLET: at 08:00

## 2025-05-27 RX ADMIN — THIAMINE HCL TAB 100 MG 100 MG: 100 TAB at 08:00

## 2025-05-27 RX ADMIN — RISPERIDONE 1 MG: 0.5 TABLET, ORALLY DISINTEGRATING ORAL at 20:44

## 2025-05-27 RX ADMIN — RISPERIDONE 1 MG: 0.5 TABLET, ORALLY DISINTEGRATING ORAL at 08:00

## 2025-05-27 RX ADMIN — TRAZODONE HYDROCHLORIDE 100 MG: 100 TABLET ORAL at 20:44

## 2025-05-27 ASSESSMENT — PAIN SCALES - GENERAL: PAINLEVEL_OUTOF10: 0

## 2025-05-27 NOTE — GROUP NOTE
Group Therapy Note    Date: 5/27/2025    Group Start Time: 1330  Group End Time: 1415  Group Topic: Recreational    [facility] Milly Napier CTRS           Patient's Goal:  pt will demonstrate improved coping skills and improved leisure awareness    Notes:   pt was pleasant and participated well    Status After Intervention:  Improved    Participation Level: Active Listener    Participation Quality: Appropriate      Speech:  minimal      Thought Process/Content: Logical      Affective Functioning: Flat      Mood: depressed      Level of consciousness:  Alert      Response to Learning: Able to verbalize current knowledge/experience and Progressing to goal      Endings: None Reported    Modes of Intervention: Education, Support, and Activity      Discipline Responsible: Psychoeducational Specialist      Signature:  DWAINE HAZEL

## 2025-05-27 NOTE — FLOWSHEET NOTE
05/27/25 0829   Treatment Team Notification   Reason for Communication Review case  (Troponin levesl 39 & 43)   Name of Team Member Notified Dr. Ashtyn Melgoza   Treatment Team Role Consulting Provider   Method of Communication Call   Response Other (Comment)  (Telephone via [employer], no answer, left message)   Notification Time 0826

## 2025-05-27 NOTE — GROUP NOTE
Group Therapy Note    Date: 5/27/2025    Group Start Time: 1000  Group End Time: 1030  Group Topic: Psychotherapy     Kelli Segovia MSW        Group Therapy Note    Attendees: 3/5     Patient was offered group therapy today but declined to participate despite encouragement from staff.  1:1 was offered.    Discipline Responsible: /Counselor      Signature:  RAFA Garcia

## 2025-05-27 NOTE — PROGRESS NOTES
Behavioral Services  Medicare Certification Upon Admission    I certify that this patient's inpatient psychiatric hospital admission is medically necessary for:    [x] (1) Treatment which could reasonably be expected to improve this patient's condition,       [x] (2) Or for diagnostic study;     AND     [x](2) The inpatient psychiatric services are provided while the individual is under the care of a physician and are included in the individualized plan of care.    Estimated length of stay/service 4-7 days    Plan for post-hospital care home with outpatient [facility] fu    Electronically signed by WENDI DOTY MD on 5/23/2025 at 6:40 AM      
    [facility] Internal Medicine  Mateo Sanchez MD; Cory France MD,, Tanesha Mckinney MD, Dr Zulema Schmitt MD   ; Samm Rahman MD    [facility] Internal Medicine   IN-PATIENT SERVICE   [facility]    Progress            Date:   5/26/2025  Patient name:  Daniel Pina Jr.  Date of admission:  5/22/2025  1:17 PM  MRN:   [id number]  Account:  [id number]  YOB: 1960  PCP:    No primary care provider on file.  Room:   [address] [place] [address]  Code Status:    Full Code      Chief Complaint:     Suicidal /Ac Psychosis    History Obtained From:     Patient/EMR/bedside RN     History of Present Illness:     64-year-old -American gentleman with history of mental health disorder depression history of fatty liver admitted for mental health condition noted to have anemia hemoglobin 7.2 normocytic with MCV is more than 90 with history of normal B12 high ferritin levels patient has history of alcohol abuse and elevated LFTs in the past  Patient had an office visit with GI in August 2020 but did not follow-up  Patient denies any GI bleed patient did have history of self-inflicted wounds  Hypokalemia potassium 3.1 denies any nausea vomiting diarrhea no cathartic or diuretic abuse no paresthesia    Past Medical History:     Past Medical History:   Diagnosis Date    Depression     Fatty liver 8/26/2020    Hemorrhoids     Hx of blood clots     dvt and PE    Schizophrenia (HCC)         Past Surgical History:     Past Surgical History:   Procedure Laterality Date    COLONOSCOPY  5-28-14    normal exam, mild diverticulosis    OTHER SURGICAL HISTORY      jugular and carotid artery repair s/p suicide attempt        Medications Prior to Admission:     Prior to Admission medications    Medication Sig Start Date End Date Taking? Authorizing Provider   Nutritional Supplements (ENSURE PLUS) LIQD Take 1 Can by mouth daily 8/25/20 9/24/20  Sobeida Fernandez APRN - NP   sennosides-docusate 
    [facility] Internal Medicine  Mateo Sanchez MD; Cory France MD,, Tanesha Mckinney MD, Dr Zulema Schmitt MD   ; Samm Rahman MD    [facility] Internal Medicine   IN-PATIENT SERVICE   [facility]    Progress            Date:   5/27/2025  Patient name:  Daniel Pina Jr.  Date of admission:  5/22/2025  1:17 PM  MRN:   [id number]  Account:  [id number]  YOB: 1960  PCP:    No primary care provider on file.  Room:   [address] [place] [address]  Code Status:    Full Code      Chief Complaint:     Suicidal /Ac Psychosis    History Obtained From:     Patient/EMR/bedside RN     History of Present Illness:     64-year-old -American gentleman with history of mental health disorder depression history of fatty liver admitted for mental health condition noted to have anemia hemoglobin 7.2 normocytic with MCV is more than 90 with history of normal B12 high ferritin levels patient has history of alcohol abuse and elevated LFTs in the past  Patient had an office visit with GI in August 2020 but did not follow-up  Patient denies any GI bleed patient did have history of self-inflicted wounds  Hypokalemia potassium 3.1 denies any nausea vomiting diarrhea no cathartic or diuretic abuse no paresthesia    Past Medical History:     Past Medical History:   Diagnosis Date    Depression     Fatty liver 8/26/2020    Hemorrhoids     Hx of blood clots     dvt and PE    Schizophrenia (HCC)         Past Surgical History:     Past Surgical History:   Procedure Laterality Date    COLONOSCOPY  5-28-14    normal exam, mild diverticulosis    OTHER SURGICAL HISTORY      jugular and carotid artery repair s/p suicide attempt        Medications Prior to Admission:     Prior to Admission medications    Medication Sig Start Date End Date Taking? Authorizing Provider   Nutritional Supplements (ENSURE PLUS) LIQD Take 1 Can by mouth daily 8/25/20 9/24/20  Sobeida Fernandez APRN - NP   sennosides-docusate 
    [facility]  Mateo Sanchez MD; Cory France MD,, Tanesha Mckinney MD, Dr Zulema Schmitt MD   ; Samm Rahman MD    [facility] Internal Medicine   IN-PATIENT SERVICE   [facility]    Progress            Date:   5/24/2025  Patient name:  Daniel Pina Jr.  Date of admission:  5/22/2025  1:17 PM  MRN:   [id number]  Account:  [id number]  YOB: 1960  PCP:    No primary care provider on file.  Room:   [address] [place] [address]  Code Status:    Full Code      Chief Complaint:     Suicidal /Ac Psychosis    History Obtained From:     Patient/EMR/bedside RN     History of Present Illness:     64-year-old -American gentleman with history of mental health disorder depression history of fatty liver admitted for mental health condition noted to have anemia hemoglobin 7.2 normocytic with MCV is more than 90 with history of normal B12 high ferritin levels patient has history of alcohol abuse and elevated LFTs in the past  Patient had an office visit with GI in August 2020 but did not follow-up  Patient denies any GI bleed patient did have history of self-inflicted wounds  Hypokalemia potassium 3.1 denies any nausea vomiting diarrhea no cathartic or diuretic abuse no paresthesia    Past Medical History:     Past Medical History:   Diagnosis Date    Depression     Fatty liver 8/26/2020    Hemorrhoids     Hx of blood clots     dvt and PE    Schizophrenia (HCC)         Past Surgical History:     Past Surgical History:   Procedure Laterality Date    COLONOSCOPY  5-28-14    normal exam, mild diverticulosis    OTHER SURGICAL HISTORY      jugular and carotid artery repair s/p suicide attempt        Medications Prior to Admission:     Prior to Admission medications    Medication Sig Start Date End Date Taking? Authorizing Provider   Nutritional Supplements (ENSURE PLUS) LIQD Take 1 Can by mouth daily 8/25/20 9/24/20  Sobeida Fernandez APRN - NP   sennosides-docusate 
    [facility] Internal Medicine  Mateo Sanchez MD; Cory France MD,, Tanesha Mckinney MD, Dr Zulema Schmitt MD   ; Samm Rahman MD    [facility] Internal Medicine   IN-PATIENT SERVICE   [facility]    Progress            Date:   5/25/2025  Patient name:  Daniel Pina Jr.  Date of admission:  5/22/2025  1:17 PM  MRN:   [id number]  Account:  [id number]  YOB: 1960  PCP:    No primary care provider on file.  Room:   [address] [place] [address]  Code Status:    Full Code      Chief Complaint:     Suicidal /Ac Psychosis    History Obtained From:     Patient/EMR/bedside RN     History of Present Illness:     64-year-old -American gentleman with history of mental health disorder depression history of fatty liver admitted for mental health condition noted to have anemia hemoglobin 7.2 normocytic with MCV is more than 90 with history of normal B12 high ferritin levels patient has history of alcohol abuse and elevated LFTs in the past  Patient had an office visit with GI in August 2020 but did not follow-up  Patient denies any GI bleed patient did have history of self-inflicted wounds  Hypokalemia potassium 3.1 denies any nausea vomiting diarrhea no cathartic or diuretic abuse no paresthesia    Past Medical History:     Past Medical History:   Diagnosis Date    Depression     Fatty liver 8/26/2020    Hemorrhoids     Hx of blood clots     dvt and PE    Schizophrenia (HCC)         Past Surgical History:     Past Surgical History:   Procedure Laterality Date    COLONOSCOPY  5-28-14    normal exam, mild diverticulosis    OTHER SURGICAL HISTORY      jugular and carotid artery repair s/p suicide attempt        Medications Prior to Admission:     Prior to Admission medications    Medication Sig Start Date End Date Taking? Authorizing Provider   Nutritional Supplements (ENSURE PLUS) LIQD Take 1 Can by mouth daily 8/25/20 9/24/20  Sobeida Fernandez APRN - NP   sennosides-docusate 
  Daily Progress Note  5/23/2025    Patient Name: Daniel Pina Jr.    CHIEF COMPLAINT:  Acute Psychosis          SUBJECTIVE:      Patient is seen today for a follow up assessment.  Per nursing staff report, patient has been pleasantly confused.  He is only alert and oriented to self.  He has been compliant with scheduled medications.  He has not required any emergency medications overnight.  Patient was agreeable to assessment in the day area today.  He was observed watching TV with one-to-one sitter present upon approach.  He reports his mood is \"nervous\".  However, he cannot tell writer the reasons he has anxiety.  He presents with confusion and disorganized thoughts.  However, he is friendly and pleasant with writer.  He denies suicidal ideations, homicidal ideations, auditory or visual hallucinations, side effects to medications, paranoia, or delusional thoughts.  He denies attending group therapy.  Group therapy encouraged by writer.  However, one-to-one sitter states that patient did go to one group today.    Patient lacks judgment and insight into his illness.  He continues to pose a significant threat to himself and others.  He requires inpatient hospitalization for safety and stabilization.    Appetite:  [x] Normal/Adequate/Unchanged  [] Increased  [] Decreased      Sleep:       [x] Normal/Adequate/Unchanged  [] Fair  [] Poor      Group Attendance on Unit:   [] Yes  [] Selectively    [x] No    Medication Side Effects:  Patient denies any medication side effects at the time of assessment.         Mental Status Exam  Level of consciousness: Alert and awake.   Appearance: Appropriate attire for setting, seated in chair, with fair  grooming and hygiene.   Behavior/Motor: Approachable, cooperative, anxious.  Attitude toward examiner: Cooperative, friendly, good eye contact.  Speech: Normal rate, normal volume, normal tone.  Mood:  Patient reports \"nervous\".   Affect: anxious  Thought processes: confused, 
  Daily Progress Note  5/24/2025    Patient Name: Daniel Pina Jr.    CHIEF COMPLAINT: Acute psychosis         SUBJECTIVE:    Patient is seen today for a follow up assessment.  Patient is seen today for initial assessment.  He is found sitting in wheelchair at dayroom table.  One-to-one sitter present for safety. He is alert and oriented to self only. He is pleasant and minimally able to engage in meaningful discussion. Thought processes are disorganized and confused. He describes feeling \"better\". He further expresses taking it \"day by day\". He is unable to describe what has improved. He expresses some guilt about \"the way I acted yesterday\".  Reports hearing voices but he is unable to elaborate. He does not appear preoccupied by internal stimuli.  He denies suicidal or homicidal ideations. Sleep and appetite are poor.  Last night, he is noted to have increased paranoia believing people are out to harm him. He requested weapons for protection. He was able to redirect without need for emergency medications. This morning, he was redirected from exit seeking behaviors. He remains paranoia with belief his brother is out to harm him. Last night, he accepted Risperdal with verbal encouragement.  He readily accepted medications this morning without incident.  No adverse effects of Risperdal reported.  He continues unsteady and at high risk for falls.  Will maintain one-to-one observation for safety.  Labs and vitals reviewed.  Hemoglobin 8.9, hematocrit 27.1.  He is seen by internal medicine with order to check stool for occult blood.    At this time, patient remains at risk of harm to self and others.  His insight and judgment remain poor.  He requires continued inpatient hospitalization for safety and stabilization.  No medication changes made today.  Will continue risperidone 1 mg twice daily.  Depakote 250 mg 3 times daily remains on hold related to elevated LFTs and fatty liver.    Appetite:  [] 
  Daily Progress Note  5/25/2025    Patient Name: Daniel Pina Jr.    CHIEF COMPLAINT: Acute psychosis         SUBJECTIVE:    Patient is seen today for a follow up assessment.  He agrees to conduct interview at dayroom table.  Denies need for privacy.  Sitter present for safety. He exhibits improvement of speech poverty.  He describes mood is \"okay\".  He endorses improvement of paranoia towards his brother stating \"I feel better about him\". He reports finding his medication helpful.  He is compliant with taking Risperdal 1 mg twice daily.  No adverse effects reported.  He denies suicidal or homicidal ideation.  He endorses some improvement of auditory hallucinations. He does not appear preoccupied by internal stimuli.  He reports some improvement of sleep overnight.  He denies significant improvement of appetite.  After discussion, he  declines start of Remeron 7.5 mg for poor appetite and sleep.  He states, \"I will see how things go\". He is attending select groups and remains mostly isolated to self.   Nursing denies behavioral disturbances or need for emergency medications in the past 24 hours.  At this time, patient is showing modest signs of improvement but remains unstable for discharge. He requires continued inpatient hospitalization for safety and stabilization.  No medication changes made today.  Will continue risperidone 1 mg twice daily.  Depakote 250 mg 3 times daily remains on hold related to elevated LFTs and fatty liver.    Appetite:  [] Adequate/Unchanged  [] Increased  [x] Decreased      Sleep:       [] Adequate/Unchanged  [] Fair  [x] Some improvement      Group Attendance on Unit:   [] Yes   [x] Selectively    [] No    Compliant with scheduled medications: [x] Yes  [] No    Received emergency medications in past 24 hrs: [] Yes   [x] No    Medication Side Effects: Denies         Mental Status Exam  Level of consciousness: Alert and awake   Appearance: Appropriate attire for setting, seated in 
  Daily Progress Note  5/26/2025    Patient Name: Daniel Pina Jr.    CHIEF COMPLAINT:  Acute psychosis           SUBJECTIVE:    Patient is seen today for a follow up assessment. He has been compliant with scheduled medications and has been behaviorally in control. He has not required any emergency medications for agitation in the past 24 hours. He is seated on bed upon approach. He states that he is \"okay.\" He reports some depression and anxiety but is unable to rate it. He denies suicidal and homicidal ideation. He continues to have auditory hallucinations, reporting that he hears voices telling him to \"give up.\" He denies visual hallucinations. He continues to experience paranoia, unsure of how he feels towards his brother. He denies side effects from his medications. At this time, his symptoms remain unstable for discharge and he is unable to contract for safety in the community. He requires continued inpatient hospitalization for safety and stabilization.     Appetite:  [] Adequate/Unchanged  [] Increased  [x] Decreased      Sleep:       [] Adequate/Unchanged  [x] Fair  [] Poor      Group Attendance on Unit:   [] Yes   [x] Selectively    [] No    Compliant with scheduled medications: [x] Yes  [] No    Received emergency medications in past 24 hrs: [] Yes   [x] No    Medication Side Effects: Denies         Mental Status Exam  Level of consciousness: Alert and awake   Appearance: Appropriate attire for setting, seated on bed, with fair  grooming and hygiene   Behavior/Motor: Approachable, engages with interviewer, no psychomotor abnormalities   Attitude toward examiner: Cooperative, attentive, good eye contact  Speech: normal rate and normal volume   Mood:  \"Okay\"  Affect: Mood congruent   Thought processes: linear, goal directed, and slow   Thought content: Denies homicidal ideation  Suicidal Ideation: Denies suicidal ideations, contracts for safety on the unit.   Delusions: Endorses paranoia.    Perceptual 
  Daily Progress Note  5/27/2025    Patient Name: Daniel Pina Jr.    CHIEF COMPLAINT:  Acute psychosis           SUBJECTIVE:    Patient is seen today for a follow up assessment. He has been compliant with scheduled medications and has been behaviorally in control. He has not required any emergency medications for agitation in the past 24 hours. He is seated in wheelchair upon approach. He states that he is \"good.\" He reports some depression and helplessness. He denies suicidal and homicidal ideation. He continues to have auditory hallucinations, reporting that he hears voices telling him to \"go lay down.\" He denies visual hallucinations. He continues to experience paranoia related to his brother. He describes his medications as \"okay,\" denying side effects. At this time, his symptoms remain unstable for discharge. He requires continued inpatient hospitalization for safety and stabilization until appropriate discharge is arranged.     Appetite:  [] Adequate/Unchanged  [] Increased  [x] Decreased      Sleep:       [] Adequate/Unchanged  [x] Fair  [] Poor      Group Attendance on Unit:   [] Yes   [x] Selectively    [] No    Compliant with scheduled medications: [x] Yes  [] No    Received emergency medications in past 24 hrs: [] Yes   [x] No    Medication Side Effects: Denies         Mental Status Exam  Level of consciousness: Alert and awake   Appearance: Appropriate attire for setting, seated in wheelchair, with fair  grooming and hygiene   Behavior/Motor: Approachable, engages with interviewer, no psychomotor abnormalities   Attitude toward examiner: Cooperative, attentive, good eye contact  Speech: normal rate and normal volume   Mood:  \"Good\"  Affect: Flat  Thought processes: linear, goal directed, and slow   Thought content: Denies homicidal ideation  Suicidal Ideation: Denies suicidal ideations, contracts for safety on the unit.   Delusions: Endorses paranoia.    Perceptual Disturbance: Endorses auditory 
First troponin draw on patient was 33, no recent historical value available for comparison. Delay in notifying provider on value, lab value reordered with secondary draw timed appropriately.    Electronically signed by REESE Lopez CNP on 5/27/2025 at 3:15 AM    
Physical Therapy  Facility/Department: [facility]  Physical Therapy Initial Assessment    Name: Daniel Pina Jr.  : 1960  MRN: [id number]  Date of Service: 2025    Discharge Recommendations:  Subacute/Skilled Nursing Facility          Patient Diagnosis(es): There were no encounter diagnoses.  Past Medical History:  has a past medical history of Depression, Fatty liver, Hemorrhoids, Hx of blood clots, and Schizophrenia (HCC).  Past Surgical History:  has a past surgical history that includes other surgical history and Colonoscopy (14).    Assessment  Assessment: Per information obtained from the chart pt was independent in ambulation with a straight cane. At this time he needs assistance for ambulation and transfers  with a rolling walker and would benefit from physical therapy treatment while in the hospital.  Treatment Diagnosis: impaired mobility  Specific Instructions for Next Treatment: ther exs and ther activities as tolerated  Therapy Prognosis: Fair  Decision Making: Medium Complexity  History: Pt was admitted to the hospital on 25 due to Acute Psychosis.  Exam: MMT,ROM, sensations and functional mobility testing  Clinical Presentation: Pt was alert,cooperative and motivated.  Barriers to Learning: cognition  Requires PT Follow-Up: Yes    Plan  Physical Therapy Plan  General Plan: 2-3 times per week  Specific Instructions for Next Treatment: ther exs and ther activities as tolerated  Current Treatment Recommendations: Strengthening, Balance training, Functional mobility training, Transfer training, Gait training  Safety Devices  Type of Devices: All fall risk precautions in place, Call light within reach, Gait belt, Patient at risk for falls, Left in bed, Sitter present  Restraints  Restraints Initially in Place: No    Restrictions  Restrictions/Precautions  Restrictions/Precautions: Modified Diet (1:1 sitter)  Activity Level: Other (Comment) (activity as tolerated)  Required Braces or 
RT ASSESSMENT TREATMENT GOALS    [x]Pt Goal:  Pt will identify 1-2 positive coping skills by time of discharge.    []Pt Goal:  Pt will identify 1-2 positive aspects of self by time of discharge.    []Pt Goal:  Pt will remain on task/topic for 15-30 minutes during group by time of discharge.    []Pt Goal:  Pt will identify 1-2 aspects of relapse prevention plan by time of discharge.    []Pt Goal:  Pt will join in conversation with peers 1-2 times per group by time of discharge.    []Pt Goal:  Pt will identify 1-2 new leisure interests by time of discharge.    [x]Pt Goal:  Pt will not voice any delusional content by time of discharge.    
fatty liver and alcohol use disorder.  Previously was on Depakote 250 mg 3 times daily, Invega Sustenna 234 mg every 4 weeks.  Also prescribed Lexapro 10 mg, trazodone 100 mg, and thiamine 100 mg.  Was previously on Stelazine and Zyprexa, but appears these medications have been held.     At this time patient's appears to be exhibiting dementia with behavioral disturbance versus delirium.  He is coming across severely cognitively impaired with significant memory deficits.  Unsure if this is a prolonged delirium from his previous hospital admission the second week of May.  He does have a history of schizoaffective disorder as well.  Documented he last received his Invega on April 30 and does go every 4 weeks.  He will be due for his injection in 8 days.  Due to his QTc of 507 with history of prolonged QTc we will hold Lexapro until updated EKG can be done.  Will hold off on starting Depakote at this time until patient's LFTs come back.  He does have a history of elevated liver enzymes.  Currently patient is remaining in behavioral control but he appears that he can be erratic and delusional.  He does require inpatient psychiatric hospitalization at this time for safety for himself and others.\"      Past Medical History:  has a past medical history of Depression, Fatty liver, Hemorrhoids, Hx of blood clots, and Schizophrenia (HCC).  Past Surgical History:  has a past surgical history that includes other surgical history and Colonoscopy (5-28-14).    Discharge Recommendations  Discharge Recommendations: Patient would benefit from continued therapy after discharge  OT Equipment Recommendations  Equipment Needed: Yes  Mobility Devices: Walker  Walker: Rolling  Other: CTA- uncertain pt's current DME, would recommend RW at this time    Assessment  Performance deficits / Impairments: Decreased functional mobility ;Decreased ADL status;Decreased strength;Decreased safe awareness;Decreased endurance;Decreased

## 2025-05-27 NOTE — GROUP NOTE
Group Therapy Note    Date: 5/27/2025    Group Start Time: 1100  Group End Time: 1130  Group Topic: Cognitive Skills    [facility]        Group Therapy Note    Attendees: 3/4       Patient's Goal:  pt will demonstrate improved coping skills     Notes:  pt was seclusive but participated with prompts    Status After Intervention:  Improved    Participation Level: Active Listener and Interactive    Participation Quality: Appropriate with prompts      Speech: none      Thought Process/Content: appears to be logical      Affective Functioning: flat      Mood: depressed but cooperative      Level of consciousness:  Alert      Response to Learning: Able to verbalize current knowledge/experience, Capable of insight, and Progressing to goal      Endings: None Reported    Modes of Intervention: Education, Support, and Activity      Discipline Responsible: Psychoeducational Specialist      Signature:  DWAINE HAZEL

## 2025-05-27 NOTE — GROUP NOTE
Group Therapy Note    Date: 5/27/2025    Group Start Time: 0815  Group End Time: 0820  Group Topic: Orientation Group    Padma Rodrigez RN        Group Therapy Note    Attendees: 5/5       Patient attended orientation group. Patient was educated on unit policies and expectations for the day. Patient was reminded of group times with limited request at the nurses station and the phone and TV being shut off. Patient's oriented to the shower's on the unit, location of the phones, and who their staff is for the day. Patient's informed that while they are on the unit they will be seen by a MD or NP. Patient reminded and reeducated on discharge process and expectations.      Signature:  Padma Mallory RN

## 2025-05-28 ENCOUNTER — APPOINTMENT (OUTPATIENT)
Dept: CT IMAGING | Age: 65
DRG: 750 | End: 2025-05-28
Attending: PSYCHIATRY & NEUROLOGY
Payer: COMMERCIAL

## 2025-05-28 VITALS
DIASTOLIC BLOOD PRESSURE: 70 MMHG | BODY MASS INDEX: 21.2 KG/M2 | HEART RATE: 124 BPM | RESPIRATION RATE: 16 BRPM | SYSTOLIC BLOOD PRESSURE: 96 MMHG | WEIGHT: 160 LBS | TEMPERATURE: 97.9 F | OXYGEN SATURATION: 98 % | HEIGHT: 73 IN

## 2025-05-28 LAB
ALBUMIN PERCENT: 45 % (ref 56–66)
ALBUMIN SERPL-MCNC: 2.9 G/DL (ref 3.2–5.2)
ALPHA 2 PERCENT: 13 % (ref 7–12)
ALPHA1 GLOB SERPL ELPH-MCNC: 0.3 G/DL (ref 0.1–0.4)
ALPHA1 GLOB SERPL ELPH-MCNC: 5 % (ref 3–5)
ALPHA2 GLOB SERPL ELPH-MCNC: 0.9 G/DL (ref 0.5–0.9)
B-GLOBULIN SERPL ELPH-MCNC: 1 G/DL (ref 0.7–1.4)
B-GLOBULIN SERPL ELPH-MCNC: 16 % (ref 8–13)
FREE KAPPA/LAMBDA RATIO: 1.04 (ref 0.22–1.74)
GAMMA GLOB SERPL ELPH-MCNC: 1.4 G/DL (ref 0.5–1.5)
GAMMA GLOBULIN %: 21 % (ref 11–19)
ITYP INTERPRETATION: ABNORMAL
KAPPA LC FREE SER-MCNC: 43 MG/L
LAMBDA LC FREE SERPL-MCNC: 41.3 MG/L (ref 4.2–27.7)
PATH REV BLD -IMP: NORMAL
PATH REV: ABNORMAL
PATHOLOGIST: ABNORMAL
PROT PATTERN SERPL ELPH-IMP: ABNORMAL
PROT SERPL-MCNC: 6.5 G/DL (ref 6.6–8.7)
TOTAL PROT. SUM,%: 100 % (ref 98–102)
TOTAL PROT. SUM: 6.5 G/DL (ref 6.3–8.2)

## 2025-05-28 PROCEDURE — 6370000000 HC RX 637 (ALT 250 FOR IP): Performed by: PSYCHIATRY & NEUROLOGY

## 2025-05-28 PROCEDURE — 6370000000 HC RX 637 (ALT 250 FOR IP)

## 2025-05-28 PROCEDURE — 99239 HOSP IP/OBS DSCHRG MGMT >30: CPT | Performed by: PSYCHIATRY & NEUROLOGY

## 2025-05-28 PROCEDURE — 70450 CT HEAD/BRAIN W/O DYE: CPT

## 2025-05-28 RX ORDER — RISPERIDONE 1 MG/1
1 TABLET ORAL 2 TIMES DAILY
Qty: 60 TABLET | Refills: 0 | Status: SHIPPED | OUTPATIENT
Start: 2025-05-28

## 2025-05-28 RX ORDER — THIAMINE MONONITRATE (VIT B1) 100 MG
100 TABLET ORAL DAILY
Qty: 30 TABLET | Refills: 0 | Status: SHIPPED | OUTPATIENT
Start: 2025-05-28

## 2025-05-28 RX ORDER — M-VIT,TX,IRON,MINS/CALC/FOLIC 27MG-0.4MG
1 TABLET ORAL DAILY
Qty: 30 TABLET | Refills: 0 | Status: SHIPPED | OUTPATIENT
Start: 2025-05-28

## 2025-05-28 RX ORDER — TRAZODONE HYDROCHLORIDE 100 MG/1
100 TABLET ORAL NIGHTLY PRN
Qty: 30 TABLET | Refills: 0 | Status: SHIPPED | OUTPATIENT
Start: 2025-05-28

## 2025-05-28 RX ADMIN — RISPERIDONE 1 MG: 0.5 TABLET, ORALLY DISINTEGRATING ORAL at 08:39

## 2025-05-28 RX ADMIN — ACETAMINOPHEN 650 MG: 325 TABLET ORAL at 04:30

## 2025-05-28 RX ADMIN — THIAMINE HCL TAB 100 MG 100 MG: 100 TAB at 08:39

## 2025-05-28 RX ADMIN — Medication 1 TABLET: at 08:39

## 2025-05-28 ASSESSMENT — PAIN SCALES - WONG BAKER: WONGBAKER_NUMERICALRESPONSE: NO HURT
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
## 2025-05-28 NOTE — DISCHARGE INSTR - DIET
Good nutrition is important when healing from an illness, injury, or surgery.  Follow any nutrition recommendations given to you during your hospital stay.   If you were given an oral nutrition supplement while in the hospital, continue to take this supplement at home.  You can take it with meals, in-between meals, and/or before bedtime. These supplements can be purchased at most local grocery stores, pharmacies, and chain [employer]-stores.   If you have any questions about your diet or nutrition, call the hospital and ask for the dietitian.  Follow pureed diet  Encourage fluids

## 2025-05-28 NOTE — GROUP NOTE
Group Therapy Note    Date: 5/28/2025    Group Start Time: 0900  Group End Time: 0905  Group Topic: Group Documentation    [facility]    Maye Madrid LPN        Group Therapy Note           Patient did not participate in Goals  group, despite staff encouragement and explanation of benefits.  Patient remain seclusive to self.  Q15 minute safety checks maintained for patient safety and will continue to encourage patient to attend unit programming.      Signature:  Maye Madrid LPN
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
## 2025-05-28 NOTE — GROUP NOTE
Group Therapy Note    Date: 5/28/2025    Group Start Time: 1000  Group End Time: 1030  Group Topic: Psychotherapy     Kelli Segovia MSW        Group Therapy Note    Attendees: 2/4     Patient was offered group therapy today but declined to participate despite encouragement from staff.  1:1 was offered.    Discipline Responsible: /Counselor      Signature:  RAFA Garcia

## 2025-05-28 NOTE — PLAN OF CARE
Problem: Self Harm/Suicidality  Goal: Will have no self-injury during hospital stay  Description: INTERVENTIONS:1.  Ensure constant observer at bedside with Q15M safety checks2.  Maintain a safe environment3.  Secure patient belongings4.  Ensure family/visitors adhere to safety recommendations5.  Ensure safety tray has been added to patient's diet order6.  Every shift and PRN: Re-assess suicidal risk via Frequent Screener  5/24/2025 0105 by Dang Martin LPN  Note: Patient is free from self related injuries and he currently denies any ideas of self harm. Patient continues with 1:1 staff for safety.     Problem: Behavior  Goal: Pt/Family maintain appropriate behavior and adhere to behavioral management agreement, if implemented  Description: INTERVENTIONS:1. Assess patient/family's coping skills and  non-compliant behavior (including use of illegal substances)2. Notify security of behavior or suspected illegal substances which indicate the need for search of the family and/or belongings3. Encourage verbalization of thoughts and concerns in a socially appropriate manner4. Utilize positive, consistent limit setting strategies supporting safety of patient, staff and others5. Encourage participation in the decision making process about the behavioral management agreement6. If a visitor's behavior poses a threat to safety call refer to organization policy.7. Initiate consult with , Psychosocial CNS, Spiritual Care as appropriate  5/24/2025 0105 by Dang Martin LPN  Note: Patient is alert and oriented to self only. Patient has been paranoid this shift about people trying to harm him. Patient requested weapons from writer several times for protection. Patient has been redirectable thus far. Patient has been cooperative with medications and care at this time.     Problem: Safety - Adult  Goal: Free from fall injury  5/24/2025 0105 by Dang Martin LPN  Note: Patient is free from 
  Problem: Self Harm/Suicidality  Goal: Will have no self-injury during hospital stay  Description: INTERVENTIONS:1.  Ensure constant observer at bedside with Q15M safety checks2.  Maintain a safe environment3.  Secure patient belongings4.  Ensure family/visitors adhere to safety recommendations5.  Ensure safety tray has been added to patient's diet order6.  Every shift and PRN: Re-assess suicidal risk via Frequent Screener  5/26/2025 0202 by Dang Martin LPN  Note: Patient is free from self related injuries and currently denies any ideas of self harm. Patient continues with 1:1 staff for safety.     Problem: Behavior  Goal: Pt/Family maintain appropriate behavior and adhere to behavioral management agreement, if implemented  Description: INTERVENTIONS:1. Assess patient/family's coping skills and  non-compliant behavior (including use of illegal substances)2. Notify security of behavior or suspected illegal substances which indicate the need for search of the family and/or belongings3. Encourage verbalization of thoughts and concerns in a socially appropriate manner4. Utilize positive, consistent limit setting strategies supporting safety of patient, staff and others5. Encourage participation in the decision making process about the behavioral management agreement6. If a visitor's behavior poses a threat to safety call refer to organization policy.7. Initiate consult with , Psychosocial CNS, Spiritual Care as appropriate  5/26/2025 0202 by Dang Martin LPN  Note: Patient has been cooperative with medications and care this shift with no behavioral outbursts observed.      Problem: Safety - Adult  Goal: Free from fall injury  5/26/2025 0202 by Dang Martin LPN  Note: Patient is free from falls at this time. Patient has non skid socks, a walker, a wheelchair for mobility and 1:1 continuous staff for safety.     Problem: Skin/Tissue Integrity  Goal: Skin integrity remains 
  Problem: Self Harm/Suicidality  Goal: Will have no self-injury during hospital stay  Description: INTERVENTIONS:1.  Ensure constant observer at bedside with Q15M safety checks2.  Maintain a safe environment3.  Secure patient belongings4.  Ensure family/visitors adhere to safety recommendations5.  Ensure safety tray has been added to patient's diet order6.  Every shift and PRN: Re-assess suicidal risk via Frequent Screener  5/26/2025 2051 by Jenniffer Aguilera LPN  Outcome: Progressing  Patient remains free from self harm, injury, and suicide. Patient used front wheel walker to ambulate with staff assistance. Patient remains free from falls. Patient remains compliant with care. Staff will continue to monitor patient by continuing to do 15 minute safety checks and 1:1  at bedside.   Problem: Behavior  Goal: Pt/Family maintain appropriate behavior and adhere to behavioral management agreement, if implemented  Description: INTERVENTIONS:1. Assess patient/family's coping skills and  non-compliant behavior (including use of illegal substances)2. Notify security of behavior or suspected illegal substances which indicate the need for search of the family and/or belongings3. Encourage verbalization of thoughts and concerns in a socially appropriate manner4. Utilize positive, consistent limit setting strategies supporting safety of patient, staff and others5. Encourage participation in the decision making process about the behavioral management agreement6. If a visitor's behavior poses a threat to safety call refer to organization policy.7. Initiate consult with , Psychosocial CNS, Spiritual Care as appropriate  5/26/2025 2051 by Jenniffer Aguilera LPN  Outcome: Progressing    Problem: Risk for Elopement  Goal: Patient will not exit the unit/facility without proper excort  5/26/2025 2051 by Jenniffer Aguilera LPN  Outcome: Progressing    Patient relaxed in room this evening, and listened to music. Music 
  Problem: Self Harm/Suicidality  Goal: Will have no self-injury during hospital stay  Description: INTERVENTIONS:1.  Ensure constant observer at bedside with Q15M safety checks2.  Maintain a safe environment3.  Secure patient belongings4.  Ensure family/visitors adhere to safety recommendations5.  Ensure safety tray has been added to patient's diet order6.  Every shift and PRN: Re-assess suicidal risk via Frequent Screener  5/27/2025 1940 by Apurva Lopez LPN  Outcome: Progressing   Patient remains free from Self-Harm at the present time.    Problem: Behavior  Goal: Pt/Family maintain appropriate behavior and adhere to behavioral management agreement, if implemented  Description: INTERVENTIONS:1. Assess patient/family's coping skills and  non-compliant behavior (including use of illegal substances)2. Notify security of behavior or suspected illegal substances which indicate the need for search of the family and/or belongings3. Encourage verbalization of thoughts and concerns in a socially appropriate manner4. Utilize positive, consistent limit setting strategies supporting safety of patient, staff and others5. Encourage participation in the decision making process about the behavioral management agreement6. If a visitor's behavior poses a threat to safety call refer to organization policy.7. Initiate consult with , Psychosocial CNS, Spiritual Care as appropriate  5/27/2025 1940 by Apurva Lopez LPN  Outcome: Progressing   Patient is maintaining appropriate behavior at present time.    Problem: Safety - Adult  Goal: Free from fall injury  5/27/2025 1940 by Apurva Lopez LPN  Outcome: Progressing   Patient remains free from falls at present time.  
  Problem: Self Harm/Suicidality  Goal: Will have no self-injury during hospital stay  Description: INTERVENTIONS:1.  Ensure constant observer at bedside with Q15M safety checks2.  Maintain a safe environment3.  Secure patient belongings4.  Ensure family/visitors adhere to safety recommendations5.  Ensure safety tray has been added to patient's diet order6.  Every shift and PRN: Re-assess suicidal risk via Frequent Screener  5/28/2025 0740 by Leticia Nevarez RN  Outcome: Progressing     Problem: Behavior  Goal: Pt/Family maintain appropriate behavior and adhere to behavioral management agreement, if implemented  Description: INTERVENTIONS:1. Assess patient/family's coping skills and  non-compliant behavior (including use of illegal substances)2. Notify security of behavior or suspected illegal substances which indicate the need for search of the family and/or belongings3. Encourage verbalization of thoughts and concerns in a socially appropriate manner4. Utilize positive, consistent limit setting strategies supporting safety of patient, staff and others5. Encourage participation in the decision making process about the behavioral management agreement6. If a visitor's behavior poses a threat to safety call refer to organization policy.7. Initiate consult with , Psychosocial CNS, Spiritual Care as appropriate  5/28/2025 0740 by Leticia Nevarez RN  Outcome: Progressing     Problem: Safety - Adult  Goal: Free from fall injury  5/28/2025 0740 by Leticia Nevarez RN  Outcome: Not progressing     Problem: Skin/Tissue Integrity  Goal: Skin integrity remains intact  Description: 1.  Monitor for areas of redness and/or skin breakdown2.  Assess vascular access sites hourly3.  Every 4-6 hours minimum:  Change oxygen saturation probe site4.  Every 4-6 hours:  If on nasal continuous positive airway pressure, respiratory therapy assess nares and determine need for appliance change or resting period  5/28/2025 0740 
  Problem: Self Harm/Suicidality  Goal: Will have no self-injury during hospital stay  Description: INTERVENTIONS:1.  Ensure constant observer at bedside with Q15M safety checks2.  Maintain a safe environment3.  Secure patient belongings4.  Ensure family/visitors adhere to safety recommendations5.  Ensure safety tray has been added to patient's diet order6.  Every shift and PRN: Re-assess suicidal risk via Frequent Screener  5/28/2025 1431 by Maye Madrid LPN  Outcome: Adequate for Discharge  5/28/2025 0740 by Leticia Nevarez RN  Outcome: Progressing     Problem: Behavior  Goal: Pt/Family maintain appropriate behavior and adhere to behavioral management agreement, if implemented  Description: INTERVENTIONS:1. Assess patient/family's coping skills and  non-compliant behavior (including use of illegal substances)2. Notify security of behavior or suspected illegal substances which indicate the need for search of the family and/or belongings3. Encourage verbalization of thoughts and concerns in a socially appropriate manner4. Utilize positive, consistent limit setting strategies supporting safety of patient, staff and others5. Encourage participation in the decision making process about the behavioral management agreement6. If a visitor's behavior poses a threat to safety call refer to organization policy.7. Initiate consult with , Psychosocial CNS, Spiritual Care as appropriate  5/28/2025 1431 by Maye Madrid LPN  Outcome: Adequate for Discharge  5/28/2025 0740 by Leticia Nevarez RN  Outcome: Progressing     Problem: Safety - Adult  Goal: Free from fall injury  5/28/2025 1431 by Maye Madrid LPN  Outcome: Adequate for Discharge  5/28/2025 0740 by Leticia Nevarez RN  Outcome: Progressing     Problem: Skin/Tissue Integrity  Goal: Skin integrity remains intact  Description: 1.  Monitor for areas of redness and/or skin breakdown2.  Assess vascular access sites hourly3.  Every 4-6 hours minimum:  
  Problem: Skin/Tissue Integrity  Goal: Skin integrity remains intact  Description: 1.  Monitor for areas of redness and/or skin breakdown2.  Assess vascular access sites hourly3.  Every 4-6 hours minimum:  Change oxygen saturation probe site4.  Every 4-6 hours:  If on nasal continuous positive airway pressure, respiratory therapy assess nares and determine need for appliance change or resting period  Outcome: Progressing  Note: Wound dressing is dry and intact. Will continue to monitor.     Problem: Risk for Elopement  Goal: Patient will not exit the unit/facility without proper excort  Note: Patient has shown no signs of elopement at time of admission. Patient is easily redirectable away from exit doors.     
Problem: Self Harm/Suicidality  Goal: Will have no self-injury during hospital stay  Description: INTERVENTIONS:1.  Ensure constant observer at bedside with Q15M safety checks2.  Maintain a safe environment3.  Secure patient belongings4.  Ensure family/visitors adhere to safety recommendations5.  Ensure safety tray has been added to patient's diet order6.  Every shift and PRN: Re-assess suicidal risk via Frequent Screener  5/24/2025 1245 by Enid Loomis, RN  Outcome: Progressing   Patient denied suicidal ideations. Safe environment maintained.  Safety checks every 15 minutes continued per facility policy.     Problem: Behavior  Goal: Pt/Family maintain appropriate behavior and adhere to behavioral management agreement, if implemented  Description: INTERVENTIONS:1. Assess patient/family's coping skills and  non-compliant behavior (including use of illegal substances)2. Notify security of behavior or suspected illegal substances which indicate the need for search of the family and/or belongings3. Encourage verbalization of thoughts and concerns in a socially appropriate manner4. Utilize positive, consistent limit setting strategies supporting safety of patient, staff and others5. Encourage participation in the decision making process about the behavioral management agreement6. If a visitor's behavior poses a threat to safety call refer to organization policy.7. Initiate consult with , Psychosocial CNS, Spiritual Care as appropriate  5/24/2025 1245 by Enid Loomis, RN  Outcome: Progressing   Patient is pleasant but paranoid. Believes that his brother Valentin is here but staff isn't letting him see him. Needs redirection constant to remind he is in a hospital setting.     Problem: Safety - Adult  Goal: Free from fall injury  5/24/2025 1245 by Enid Loomis, RN  Outcome: Progressing   Patient remains free from falls. Gait unsteady, he is using a wheelchair. 1:1 by side when he voids.     Problem: Skin/Tissue 
Problem: Self Harm/Suicidality  Goal: Will have no self-injury during hospital stay  Description: INTERVENTIONS:1.  Ensure constant observer at bedside with Q15M safety checks2.  Maintain a safe environment3.  Secure patient belongings4.  Ensure family/visitors adhere to safety recommendations5.  Ensure safety tray has been added to patient's diet order6.  Every shift and PRN: Re-assess suicidal risk via Frequent Screener  5/24/2025 2012 by Reji Escamilla LPN  Outcome: Progressing  Note: Patient has not verbalized any signs or symptoms of self harm to self or others at this time. Patient instructed to notify staff if thoughts of self harm to self or others arise. 1:1 continues.     Problem: Behavior  Goal: Pt/Family maintain appropriate behavior and adhere to behavioral management agreement, if implemented  Description: INTERVENTIONS:1. Assess patient/family's coping skills and  non-compliant behavior (including use of illegal substances)2. Notify security of behavior or suspected illegal substances which indicate the need for search of the family and/or belongings3. Encourage verbalization of thoughts and concerns in a socially appropriate manner4. Utilize positive, consistent limit setting strategies supporting safety of patient, staff and others5. Encourage participation in the decision making process about the behavioral management agreement6. If a visitor's behavior poses a threat to safety call refer to organization policy.7. Initiate consult with , Psychosocial CNS, Spiritual Care as appropriate  5/24/2025 2012 by Reji Escamilla LPN  Outcome: Progressing  Note: Patient has maintained appropriate behavior during shift. No angry threats or outburst at this current time.     Problem: Safety - Adult  Goal: Free from fall injury  5/24/2025 2012 by Reji Escamilla LPN  Outcome: Progressing  Note: Patient has been using a wheelchair for transfers due to unsteady gait. Patient currently remains free from 
Problem: Self Harm/Suicidality  Goal: Will have no self-injury during hospital stay  Description: INTERVENTIONS:1.  Ensure constant observer at bedside with Q15M safety checks2.  Maintain a safe environment3.  Secure patient belongings4.  Ensure family/visitors adhere to safety recommendations5.  Ensure safety tray has been added to patient's diet order6.  Every shift and PRN: Re-assess suicidal risk via Frequent Screener  5/26/2025 1315 by Enid Loomis, RN  Outcome: Progressing   Patient denies suicidal ideations. Safe environment maintained. Safety checks every 15 minutes continued per facility policy.     Problem: Behavior  Goal: Pt/Family maintain appropriate behavior and adhere to behavioral management agreement, if implemented  Description: INTERVENTIONS:1. Assess patient/family's coping skills and  non-compliant behavior (including use of illegal substances)2. Notify security of behavior or suspected illegal substances which indicate the need for search of the family and/or belongings3. Encourage verbalization of thoughts and concerns in a socially appropriate manner4. Utilize positive, consistent limit setting strategies supporting safety of patient, staff and others5. Encourage participation in the decision making process about the behavioral management agreement6. If a visitor's behavior poses a threat to safety call refer to organization policy.7. Initiate consult with , Psychosocial CNS, Spiritual Care as appropriate  5/26/2025 1315 by Enid Loomis, RN  Outcome: Progressing   Patient is very pleasant.     Problem: Safety - Adult  Goal: Free from fall injury  5/26/2025 1315 by Enid Loomis, RN  Outcome: Progressing   Patient remains free from falls. He is using the wheelchair. 1:1 present for fall risk.     Problem: Skin/Tissue Integrity  Goal: Skin integrity remains intact  Description: 1.  Monitor for areas of redness and/or skin breakdown2.  Assess vascular access sites hourly3.  Every 4-6 
4-6 hours minimum:  Change oxygen saturation probe site4.  Every 4-6 hours:  If on nasal continuous positive airway pressure, respiratory therapy assess nares and determine need for appliance change or resting period  Outcome: Progressing   Redness to buttocks, cream applied.     Problem: Risk for Elopement  Goal: Patient will not exit the unit/facility without proper excort  Outcome: Progressing   Patient has not attempted to elope from unit.   
implemented:   Assess patient/family’s coping skills and  non-compliant behavior (including use of illegal substances)   Notify security of behavior or suspected illegal substances which indicate the need for search of the patient and/or belongings   Encourage verbalization of thoughts and concerns in a socially appropriate manner   Utilize positive, consistent limit setting strategies supporting safety of patient, staff and others   Encourage participation in the decision making process about the behavioral management agreement  Note: Patient is pleasantly confused. He take his medication as prescribed. Patient has remained in behavior control. He follows direction well.      Problem: Safety - Adult  Goal: Free from fall injury  Outcome: Progressing  Flowsheets (Taken 5/27/2025 1824)  Free From Fall Injury: Instruct family/caregiver on patient safety  Note: Pt remains free of falls and verbalizes understanding of individual fall risks.  Pt wearing non skid footwear and encouraged to seek out staff for any assistance needed.         Problem: Skin/Tissue Integrity  Goal: Skin integrity remains intact  Description: 1.  Monitor for areas of redness and/or skin breakdown2.  Assess vascular access sites hourly3.  Every 4-6 hours minimum:  Change oxygen saturation probe site4.  Every 4-6 hours:  If on nasal continuous positive airway pressure, respiratory therapy assess nares and determine need for appliance change or resting period  Outcome: Progressing  Flowsheets (Taken 5/27/2025 1824)  Skin Integrity Remains Intact: Monitor for areas of redness and/or skin breakdown  Note: Skin remains intact     
Progressing  Flowsheets (Taken 5/23/2025 1450)  Nursing Interventions for Elopement Risk:   Assist with personal care needs such as toileting, eating, dressing, as needed to reduce the risk of wandering   Communicate/escalate to charge nurse the risk of elopement   Communicate/escalate to nursing supervisor the risk of elopement   Communicate/escalate to /other team member the risk of elopement   Make sure patient has all necessary personal care items   Place patient in room far away from exits and stairways   Reduce environmental triggers   Shoes and clothing collected and placed in gown attire  Note: No evidence of elopement. Patient has a sitter at bedside.

## 2025-05-28 NOTE — TRANSITION OF CARE
Behavioral Health Transition Record    Patient Name: Daniel Pina Jr.  YOB: 1960   Medical Record Number: [id number]  Date of Admission: 5/22/2025  1:17 PM   Date of Discharge: 5/28/2025     Attending Provider: Vaibhav Posada MD   Discharging Provider: Dr Posada  To contact this individual call [phone] and ask the  to page.  If unavailable, ask to be transferred to Behavioral Health Provider on call.  A Behavioral Health Provider will be available on call 24/7 and during holidays.    Primary Care Provider: No primary care provider on file.    No Known Allergies    Reason for Admission:   Patient: Daniel Pina Jr.  MRN: [id number]  Reason for Admission to Psychiatric Unit:  Threat to self requiring 24 hour professional observation  Threat to others requiring 24 hour professional observation  Acute disordered/bizarre behavior or psychomotor agitation or retardation;interferes with ADLs so that patient cannot function at a less intensive care level of care during evaluation and treatment   Cognitive impairment (disorientation or memory loss) due to a mental disorder excluding personality disorders or mental retardation (i.e. Axis I disorder);endangers welfare of patient or others   Patient has a dementing disorder and is admitted for eval or treatment of a psychiatric comorbidity (i.e. risk of suicide, violence, severe depression) warranting inpatient admission   A mental disorder causing major disability in social, interpersonal, occupational, and/or educational functioning that is leading to dangerous or life-threatening functioning, and that can only be addressed in an acute inpatient setting   A mental disorder that causes an inability to maintain adequate nurtrition or self-care, and family/community support cannot provide reliable, essential care, so that the patient cannont function at a less intensive level of care during evaluation and treatment   Failure of outpatient psychiatry

## 2025-05-28 NOTE — DISCHARGE INSTRUCTIONS
Information:  Medications:   Medication summary provided   I understand that I should take only the medications on my list.     -why and when I need to take each medicine.     -which side effects to watch for.     -that I should carry my medication information at all times in case of     Emergency situations.    I will take all of my medicines to follow up appointments.     -check with my physician or pharmacist before taking any new    Medication, over the counter product or drink alcohol.    -Ask about food, drug or dietary supplement interactions.    -discard old lists and update records with medication providers.    Notify Physician:  Notify physician if you notice:   Always call 911 if you feel your life is in danger  In case of an emergency call 911 immediately!  If 911 is not available call your local emergency medical system for help    Behavioral Health Follow Up:  Original Referral Source: [facility]  Discharge Diagnosis: Acute psychosis (HCC) [F23]  Recommendations for Level of Care: Compliance with medications and follow-up appointments  Patient status at discharge: baseline  My hospital  was: [facility]  Aftercare plan faxed: Yes   -faxed by: staff   -date: 5/28/2025   -time: 1400  Prescriptions: [facility]    Smoking: Quit Smoking.   Call the [employer]'s smoking quitline at 3-097-39X-QUIT  Know the signs of a heart attack   If you have any of the following symptoms call 911 immediately, do not wait more    Than five minutes.    1. Pressure, fullness and/ or squeezing in the center of the chest spreading to    The jaw, neck or shoulder.    2. Chest discomfort with light headedness, fainting, sweating, nausea or    Shortness of breath.   3. Upper abdominal pressure or discomfort.   4. Lower chest pain, back pain, unusual fatigue, shortness of breath, nausea   Or dizziness.     General Information:   Questions regarding your bill: Call HELP program [phone]     Suicide

## 2025-05-28 NOTE — DISCHARGE SUMMARY
Provider Discharge Summary     Patient ID:  Daniel Pina Jr.  [id number]  64 y.o.  1960    Admit date: 5/22/2025    Discharge date and time: 5/28/2025  6:36 PM     Admitting Physician: Ivan Perdue MD     Discharge Physician: Vaibhav Posada MD    Admission Diagnoses: Acute psychosis (HCC) [F23]    Discharge Diagnoses:      Acute psychosis (HCC)     Patient Active Problem List   Diagnosis Code    Hemorrhoids K64.9    Rectal bleeding K62.5    Hemorrhoid K64.9    Hepatic steatosis K76.0    Constipation K59.00    Acute psychosis (HCC) F23    History of alcohol use Z87.898    Anemia D64.9        Admission Condition: poor    Discharged Condition: stable    Indication for Admission: threat to self    History of Present Illnes (present tense wording is of findings from admission exam and are not necessarily indicative of current findings):   Daniel Pina Jr. is a 64 y.o. male who has a past medical history of fatty liver, schizophrenia, blood clots, sick sinus rhythm, cardiac arrest. Patient presented to the ED at [facility] Per collateral (nursing staff at [facility]):  Patient reported last tonight to be combative with staff, refusing care, having delusions and hallucinations. He had called the fire alarm and attempted to elope from his facility and became combative with staff and alleging that he was going to hurt the staff members and himself. Patient further reported to have thrown a glass structure onto the floor and attempt to  glasses pieces to use as a weapon. He continued to make verbal threats to hurt himself causing the police to be called to [facility]. When police arrived patient was more calm and told police that he was at [facility] because he had fallen off a horse (false information).   Nurse on the phone states patient is set to have a psychiatric evaluation within the next week with the facility psychiatrist. In addition, he has an upcoming appointment at [facility] with his provider Delicia Dwyer

## 2025-05-28 NOTE — GROUP NOTE
Group Therapy Note    Date: 5/28/2025    Group Start Time: 1100  Group End Time: 1130  Group Topic: Cognitive Skills    Milly Issa [facility]    Cognitive Skills Group Note        Date: May 28, 2025 Start Time: 11am  End Time: 11:30am      Number of Participants in Group & Unit Census:  0/2    Topic: cognitive skills     Goal of Group: pt will demonstrate improved coping skills and improved interpersonal relationships      Comments:     Patient did not participate in Cognitive Skills group, despite staff encouragement and explanation of benefits.  Patient remain seclusive to self.  Q15 minute safety checks maintained for patient safety and will continue to encourage patient to attend unit programming.              Signature:  DWAINE HAZEL

## 2025-07-03 ENCOUNTER — TRANSCRIBE ORDERS (OUTPATIENT)
Dept: ADMINISTRATIVE | Age: 65
End: 2025-07-03

## 2025-07-03 DIAGNOSIS — R94.31 ABNORMAL EKG: Primary | ICD-10-CM
